# Patient Record
Sex: FEMALE | Race: WHITE | NOT HISPANIC OR LATINO | Employment: STUDENT | URBAN - METROPOLITAN AREA
[De-identification: names, ages, dates, MRNs, and addresses within clinical notes are randomized per-mention and may not be internally consistent; named-entity substitution may affect disease eponyms.]

---

## 2019-03-23 ENCOUNTER — TRANSFERRED RECORDS (OUTPATIENT)
Dept: HEALTH INFORMATION MANAGEMENT | Facility: CLINIC | Age: 24
End: 2019-03-23

## 2019-03-25 ENCOUNTER — DOCUMENTATION ONLY (OUTPATIENT)
Dept: CARE COORDINATION | Facility: CLINIC | Age: 24
End: 2019-03-25

## 2019-03-29 ENCOUNTER — OFFICE VISIT (OUTPATIENT)
Dept: DERMATOLOGY | Facility: CLINIC | Age: 24
End: 2019-03-29
Payer: COMMERCIAL

## 2019-03-29 VITALS — SYSTOLIC BLOOD PRESSURE: 99 MMHG | DIASTOLIC BLOOD PRESSURE: 67 MMHG | HEART RATE: 53 BPM

## 2019-03-29 DIAGNOSIS — L70.0 ACNE VULGARIS: Primary | ICD-10-CM

## 2019-03-29 RX ORDER — TRETINOIN 0.25 MG/G
1 CREAM TOPICAL
COMMUNITY
Start: 2018-03-30 | End: 2019-03-30

## 2019-03-29 RX ORDER — MINOCYCLINE HYDROCHLORIDE 50 MG/1
CAPSULE ORAL
COMMUNITY
Start: 2019-02-21 | End: 2019-10-28

## 2019-03-29 RX ORDER — CLINDAMYCIN PHOSPHATE 10 MG/G
1 GEL TOPICAL
COMMUNITY
Start: 2018-11-28 | End: 2019-10-28

## 2019-03-29 RX ORDER — SPIRONOLACTONE 50 MG/1
50 TABLET, FILM COATED ORAL 2 TIMES DAILY
Qty: 60 TABLET | Refills: 2 | Status: SHIPPED | OUTPATIENT
Start: 2019-03-29 | End: 2019-06-26

## 2019-03-29 ASSESSMENT — PAIN SCALES - GENERAL: PAINLEVEL: NO PAIN (0)

## 2019-03-29 NOTE — PROGRESS NOTES
[facility] Dermatology Note      Dermatology Problem List:  1. Acne vulgaris   - tretinoin 0.025% cream, clindamycin 1% gel, spironolactone 25 mg     Encounter Date: Mar 29, 2019    CC:  Chief Complaint   Patient presents with     Acne     Yaritza is here today to talk about acne treatment. She states that she has had issues with her acne since she was in high school. She has tried Clindamycin, Tretinoin, Oral Minocycline, OCP.          History of Present Illness:  Ms. Yaritza Vuong  is a 23 year old female who presents today for acne evaluation. This is the patient's first visit in our dermatology clinic. She has already been seen for acne by her primary care in [place], [place]. She is currently on oral minocycline, clindamycin gel, and tretinoin cream. This was started about a year ago. Minocycline was started half a year ago. Denies GI problems or other symptoms. She has been dealing with acne since high school. This worsened during once she started undergraduate. She has noticed some improvement with her current medication regimen. She would estimate 50% improvement. She hasn't been getting deeper acne, but instead more superficial breakouts and generalized redness. When she started her other acne medications, she also went on oral birth control. She only took this for about 3 months before stopping due to unwanted side effects. She did not find that this helped. Her menstrual cycle prior to birth control has always been more sporadic. Denies family history of ovarian cysts or PCOS. The patient is otherwise feeling well. There are no other skin concerns at this time.      Past Medical History:   Patient Active Problem List   Diagnosis     NO ACTIVE PROBLEMS     History reviewed. No pertinent past medical history.  History reviewed. No pertinent surgical history.    Social History:  Social History     Socioeconomic History     Marital status: Single     Spouse name: None     Number of  children: None     Years of education: None     Highest education level: None   Occupational History     None   Social Needs     Financial resource strain: None     Food insecurity:     Worry: None     Inability: None     Transportation needs:     Medical: None     Non-medical: None   Tobacco Use     Smoking status: Never Smoker     Smokeless tobacco: Never Used   Substance and Sexual Activity     Alcohol use: No     Drug use: No     Sexual activity: No   Lifestyle     Physical activity:     Days per week: None     Minutes per session: None     Stress: None   Relationships     Social connections:     Talks on phone: None     Gets together: None     Attends Zoroastrian service: None     Active member of club or organization: None     Attends meetings of clubs or organizations: None     Relationship status: None     Intimate partner violence:     Fear of current or ex partner: None     Emotionally abused: None     Physically abused: None     Forced sexual activity: None   Other Topics Concern     None   Social History Narrative     None       Family History:  History reviewed. No pertinent family history.    Medications:  Current Outpatient Medications   Medication Sig Dispense Refill     clindamycin (CLINDAMAX) 1 % external gel Apply 1 Application topically       minocycline (MINOCIN/DYNACIN) 50 MG capsule        spironolactone (ALDACTONE) 50 MG tablet Take 1 tablet (50 mg) by mouth 2 times daily 60 tablet 2     tretinoin (RETIN-A) 0.025 % external cream Apply 1 Application topically         No Known Allergies    Review of Systems:  -Skin Establ Pt: The patient denies any new rash, pruritus, or lesions that are symptomatic, changing or bleeding, except as per HPI.  -Constitutional: The patient is feeling generally well.    Physical exam:  Vitals: BP 99/67 (BP Location: Left arm, Patient Position: Chair, Cuff Size: Adult Regular)   Pulse 53   GEN: This is a well developed, well-nourished female in no acute distress, in  a pleasant mood.    SKIN: Acne exam, which includes the face, neck, upper central chest, and upper central back was performed.  - Scattered inflammatory papules to bilateral cheeks and upper back, as well as periorally.   - No other lesions of concern on areas examined.       Impression/Plan:  1. Acne vulgaris   Side effects of spironolactone discussed including postural hypotension, increased frequency of urination, breast tenderness, menstrual spotting, cardiac arrythmias and the need for contraception due to fetal feminization.    Baseline blood pressure obtained. Potassium (4.3), Sodium (143), Creatinine (0.76) and BUN/Cr (16) obtained by PCP were wnl. Discussed with patient that medication will need to be stopped if pregnancy is desired.   Start Spironolactone 50 mg daily which may be increased to twice daily after one week as tolerated.    There is no family history or personal history of breast malignancy known to patient.  Continue tretinoin 0.025% cream - apply topically to face.   Continue clindamycin 1% gel - apply topically to face.  Continue minocycline 50 mg bid. If acne improves, will begin tapering at next visit.     Follow-up in 3 months, earlier for new or changing lesions.       Staff Involved:  Staff Only    Scribe Disclosure:  I, Gurdeep Connolly, am serving as a scribe to document services personally performed by Suyapa Blake PA-C, based on data collection and the provider's statements to me.     Provider Disclosure:   The documentation recorded by the scribe accurately reflects the services I personally performed and the decisions made by me.    All risks, benefits and alternatives were discussed with patient.  Patient is in agreement and understands the assessment and plan.  All questions were answered.  Sun Screen Education was given.   Return to Clinic in 3 months or sooner as needed.   Suyapa Blake PA-C   [facility]

## 2019-03-29 NOTE — LETTER
3/29/2019       RE: Yaritza Vuong   [address] [place] [place] [address]     Dear Colleague,    Thank you for referring your patient, Yaritza Vuong , to the [facility] DERMATOLOGY at [facility]. Please see a copy of my visit note below.    [facility] Dermatology Note      Dermatology Problem List:  1. Acne vulgaris   - tretinoin 0.025% cream, clindamycin 1% gel, spironolactone 25 mg     Encounter Date: Mar 29, 2019    CC:  Chief Complaint   Patient presents with     Acne     Yaritza is here today to talk about acne treatment. She states that she has had issues with her acne since she was in high school. She has tried Clindamycin, Tretinoin, Oral Minocycline, OCP.          History of Present Illness:  Ms. Yaritza Vuong  is a 23 year old female who presents today for acne evaluation. This is the patient's first visit in our dermatology clinic. She has already been seen for acne by her primary care in [place], [place]. She is currently on oral minocycline, clindamycin gel, and tretinoin cream. This was started about a year ago. Minocycline was started half a year ago. Denies GI problems or other symptoms. She has been dealing with acne since high school. This worsened during once she started undergraduate. She has noticed some improvement with her current medication regimen. She would estimate 50% improvement. She hasn't been getting deeper acne, but instead more superficial breakouts and generalized redness. When she started her other acne medications, she also went on oral birth control. She only took this for about 3 months before stopping due to unwanted side effects. She did not find that this helped. Her menstrual cycle prior to birth control has always been more sporadic. Denies family history of ovarian cysts or PCOS. The patient is otherwise feeling well. There are no other skin concerns at this time.      Past Medical History:   Patient  Active Problem List   Diagnosis     NO ACTIVE PROBLEMS     History reviewed. No pertinent past medical history.  History reviewed. No pertinent surgical history.    Social History:  Social History     Socioeconomic History     Marital status: Single     Spouse name: None     Number of children: None     Years of education: None     Highest education level: None   Occupational History     None   Social Needs     Financial resource strain: None     Food insecurity:     Worry: None     Inability: None     Transportation needs:     Medical: None     Non-medical: None   Tobacco Use     Smoking status: Never Smoker     Smokeless tobacco: Never Used   Substance and Sexual Activity     Alcohol use: No     Drug use: No     Sexual activity: No   Lifestyle     Physical activity:     Days per week: None     Minutes per session: None     Stress: None   Relationships     Social connections:     Talks on phone: None     Gets together: None     Attends Catholic service: None     Active member of club or organization: None     Attends meetings of clubs or organizations: None     Relationship status: None     Intimate partner violence:     Fear of current or ex partner: None     Emotionally abused: None     Physically abused: None     Forced sexual activity: None   Other Topics Concern     None   Social History Narrative     None       Family History:  History reviewed. No pertinent family history.    Medications:  Current Outpatient Medications   Medication Sig Dispense Refill     clindamycin (CLINDAMAX) 1 % external gel Apply 1 Application topically       minocycline (MINOCIN/DYNACIN) 50 MG capsule        spironolactone (ALDACTONE) 50 MG tablet Take 1 tablet (50 mg) by mouth 2 times daily 60 tablet 2     tretinoin (RETIN-A) 0.025 % external cream Apply 1 Application topically         No Known Allergies    Review of Systems:  -Skin Establ Pt: The patient denies any new rash, pruritus, or lesions that are symptomatic, changing or  bleeding, except as per HPI.  -Constitutional: The patient is feeling generally well.    Physical exam:  Vitals: BP 99/67 (BP Location: Left arm, Patient Position: Chair, Cuff Size: Adult Regular)   Pulse 53   GEN: This is a well developed, well-nourished female in no acute distress, in a pleasant mood.    SKIN: Acne exam, which includes the face, neck, upper central chest, and upper central back was performed.  - Scattered inflammatory papules to bilateral cheeks and upper back, as well as periorally.   - No other lesions of concern on areas examined.       Impression/Plan:  1. Acne vulgaris   Side effects of spironolactone discussed including postural hypotension, increased frequency of urination, breast tenderness, menstrual spotting, cardiac arrythmias and the need for contraception due to fetal feminization.    Baseline blood pressure obtained. Potassium (4.3), Sodium (143), Creatinine (0.76) and BUN/Cr (16) obtained by PCP were wnl. Discussed with patient that medication will need to be stopped if pregnancy is desired.   Start Spironolactone 50 mg daily which may be increased to twice daily after one week as tolerated.    There is no family history or personal history of breast malignancy known to patient.  Continue tretinoin 0.025% cream - apply topically to face.   Continue clindamycin 1% gel - apply topically to face.  Continue minocycline 50 mg bid. If acne improves, will begin tapering at next visit.     Follow-up in 3 months, earlier for new or changing lesions.       Staff Involved:  Staff Only    Scribe Disclosure:  I, Gurdeep Connolly, am serving as a scribe to document services personally performed by Suyapa Blake PA-C, based on data collection and the provider's statements to me.     Provider Disclosure:   The documentation recorded by the scribe accurately reflects the services I personally performed and the decisions made by me.    All risks, benefits and alternatives were discussed  with patient.  Patient is in agreement and understands the assessment and plan.  All questions were answered.  Sun Screen Education was given.   Return to Clinic in 3 months or sooner as needed.   Suyapa Blake PA-C   [facility]

## 2019-03-29 NOTE — NURSING NOTE
Chief Complaint   Patient presents with     Acne     Yaritza is here today to talk about acne treatment. She states that she has had issues with her acne since she was in high school. She has tried Clindamycin, Tretinoin, Oral Minocycline, OCP.      Peggy Shore, [facility]

## 2019-04-19 ENCOUNTER — HEALTH MAINTENANCE LETTER (OUTPATIENT)
Age: 24
End: 2019-04-19

## 2019-06-26 ENCOUNTER — OFFICE VISIT (OUTPATIENT)
Dept: DERMATOLOGY | Facility: CLINIC | Age: 24
End: 2019-06-26
Payer: COMMERCIAL

## 2019-06-26 VITALS — SYSTOLIC BLOOD PRESSURE: 108 MMHG | DIASTOLIC BLOOD PRESSURE: 72 MMHG | HEART RATE: 53 BPM

## 2019-06-26 DIAGNOSIS — L70.0 ACNE VULGARIS: ICD-10-CM

## 2019-06-26 RX ORDER — SPIRONOLACTONE 50 MG/1
150 TABLET, FILM COATED ORAL DAILY
Qty: 90 TABLET | Refills: 2 | Status: SHIPPED | OUTPATIENT
Start: 2019-06-26 | End: 2019-10-10

## 2019-06-26 ASSESSMENT — PAIN SCALES - GENERAL: PAINLEVEL: NO PAIN (0)

## 2019-06-26 NOTE — PROGRESS NOTES
[facility] Dermatology Note      Dermatology Problem List:  1. Acne vulgaris   - tretinoin 0.025% cream, clindamycin 1% gel, spironolactone 150 mg  - s/p minocycline 50 mg     Encounter Date: Jun 26, 2019    CC:  Chief Complaint   Patient presents with     Derm Problem     Acne, Yaritza states her acne is slightly better.          History of Present Illness:  Ms. Yaritza Vuong  is a 23 year old female who presents today for acne follow up. She was last seen on 3/29/19 when she was started on spironolactone 100 mg daily for acne vulgaris.     Today she reports that her acne is slightly better than previously. However, she still experiences frequent superficial acne lesions. Denies deep, cystic papules. She estimates roughly 25% improvement in her acne. She has been taking all of the medications consistently, however she has been using less tretinoin than prescribed. She often uses a very small amount (half of a pea sized) of tretinoin nightly. She does not see much difference while using the tretinoin, aside from increased dryness. She would prefer to move away from the minocycline because she believes she is taking a lot of medications and not seeing much benefit. She does not think her frequent break outs are from increased sweat from the warmer weather.The patient is otherwise feeling well with no additional skin concerns today. . Denies menstrual irregularities, breast tenderness, or excessive urination.      Past Medical History:   Patient Active Problem List   Diagnosis     NO ACTIVE PROBLEMS     No past medical history on file.  No past surgical history on file.    Social History:  Social History     Socioeconomic History     Marital status: Single     Spouse name: None     Number of children: None     Years of education: None     Highest education level: None   Occupational History     None   Social Needs     Financial resource strain: None     Food insecurity:     Worry: None      Inability: None     Transportation needs:     Medical: None     Non-medical: None   Tobacco Use     Smoking status: Never Smoker     Smokeless tobacco: Never Used   Substance and Sexual Activity     Alcohol use: No     Drug use: No     Sexual activity: No   Lifestyle     Physical activity:     Days per week: None     Minutes per session: None     Stress: None   Relationships     Social connections:     Talks on phone: None     Gets together: None     Attends Yarsani service: None     Active member of club or organization: None     Attends meetings of clubs or organizations: None     Relationship status: None     Intimate partner violence:     Fear of current or ex partner: None     Emotionally abused: None     Physically abused: None     Forced sexual activity: None   Other Topics Concern     None   Social History Narrative     None       Family History:  No family history on file.    Medications:  Current Outpatient Medications   Medication Sig Dispense Refill     clindamycin (CLINDAMAX) 1 % external gel Apply 1 Application topically       minocycline (MINOCIN/DYNACIN) 50 MG capsule        spironolactone (ALDACTONE) 50 MG tablet Take 3 tablets (150 mg) by mouth daily 90 tablet 2       No Known Allergies    Review of Systems:  -Skin Establ Pt: The patient denies any new rash, pruritus, or lesions that are symptomatic, changing or bleeding, except as per HPI.  -Constitutional: The patient is feeling generally well.  The patient denies GI upset, abdominal pain, hematochezia,  headaches, blurred vision or dyspigmentation.    Physical exam:  Vitals: /72   Pulse 53   GEN: This is a well developed, well-nourished female in no acute distress, in a pleasant mood.    SKIN: Acne exam, which includes the face, neck, upper central chest, and upper central back was performed.  - Scattered closed comedones on her forehead  -Acne scarring noted to bilateral cheeks  - Scattered inflammatory papules periorally  - No other  lesions of concern on areas examined.       Impression/Plan:  1. Acne vulgaris - slight improvement on topicals and spironolactone. She defers starting alternative antibiotic today, will plan to increase current topical regimen and spironolactone dose.   Safety labs obtained at last visit. Repeat blood pressure obtained today.   Will increase daily dose of Spironolactone from 100 mg daily to 150 mg daily. Reviewed side effects of medication.    Increase use from 1/2 pea size to pea sized tretinoin 0.025% cream- apply topically to face. Retinoid ed given.  Continue clindamycin 1% gel - apply topically to face.  Start taper off minocycline, reduce daily dose to 100 mg daily then to every other day as tolerated.  If little to no improvement in symptoms at follow up, will consider Accutane process. Discussed risks and benefits of this medication.  Hx of poor tolerance to oral contraception. Pt states she is abstinent and plans for this.     Follow-up in 3 months, earlier for new or worsening symptoms       Scribe Disclosure:   I, Cristal Pinto, am serving as a scribe to document services personally performed by Suyapa Blake PA-C, based on data collection and the provider's statements to me.    Provider Disclosure:   The documentation recorded by the scribe accurately reflects the services I personally performed and the decisions made by me.    All risks, benefits and alternatives were discussed with patient.  Patient is in agreement and understands the assessment and plan.  All questions were answered.  Sun Screen Education was given.   Return to Clinic in 3 months or sooner as needed.   Suyapa Blake PA-C   [facility]

## 2019-06-26 NOTE — LETTER
6/26/2019       RE: Yaritza Vuong   [address] [place] [address]     Dear Colleague,    Thank you for referring your patient, Yaritza Vuong , to the [facility] DERMATOLOGY at [facility]. Please see a copy of my visit note below.    [facility] Dermatology Note      Dermatology Problem List:  1. Acne vulgaris   - tretinoin 0.025% cream, clindamycin 1% gel, spironolactone 150 mg  - s/p minocycline 50 mg     Encounter Date: Jun 26, 2019    CC:  Chief Complaint   Patient presents with     Derm Problem     Acne, Yaritza states her acne is slightly better.          History of Present Illness:  Ms. Yaritza Vuong  is a 23 year old female who presents today for acne follow up. She was last seen on 3/29/19 when she was started on spironolactone 100 mg daily for acne vulgaris.     Today she reports that her acne is slightly better than previously. However, she still experiences frequent superficial acne lesions. Denies deep, cystic papules. She estimates roughly 25% improvement in her acne. She has been taking all of the medications consistently, however she has been using less tretinoin than prescribed. She often uses a very small amount (half of a pea sized) of tretinoin nightly. She does not see much difference while using the tretinoin, aside from increased dryness. She would prefer to move away from the minocycline because she believes she is taking a lot of medications and not seeing much benefit. She does not think her frequent break outs are from increased sweat from the warmer weather.The patient is otherwise feeling well with no additional skin concerns today. . Denies menstrual irregularities, breast tenderness, or excessive urination.      Past Medical History:   Patient Active Problem List   Diagnosis     NO ACTIVE PROBLEMS     No past medical history on file.  No past surgical history on file.    Social History:  Social History      Socioeconomic History     Marital status: Single     Spouse name: None     Number of children: None     Years of education: None     Highest education level: None   Occupational History     None   Social Needs     Financial resource strain: None     Food insecurity:     Worry: None     Inability: None     Transportation needs:     Medical: None     Non-medical: None   Tobacco Use     Smoking status: Never Smoker     Smokeless tobacco: Never Used   Substance and Sexual Activity     Alcohol use: No     Drug use: No     Sexual activity: No   Lifestyle     Physical activity:     Days per week: None     Minutes per session: None     Stress: None   Relationships     Social connections:     Talks on phone: None     Gets together: None     Attends Restorationism service: None     Active member of club or organization: None     Attends meetings of clubs or organizations: None     Relationship status: None     Intimate partner violence:     Fear of current or ex partner: None     Emotionally abused: None     Physically abused: None     Forced sexual activity: None   Other Topics Concern     None   Social History Narrative     None       Family History:  No family history on file.    Medications:  Current Outpatient Medications   Medication Sig Dispense Refill     clindamycin (CLINDAMAX) 1 % external gel Apply 1 Application topically       minocycline (MINOCIN/DYNACIN) 50 MG capsule        spironolactone (ALDACTONE) 50 MG tablet Take 3 tablets (150 mg) by mouth daily 90 tablet 2       No Known Allergies    Review of Systems:  -Skin Establ Pt: The patient denies any new rash, pruritus, or lesions that are symptomatic, changing or bleeding, except as per HPI.  -Constitutional: The patient is feeling generally well.  The patient denies GI upset, abdominal pain, hematochezia,  headaches, blurred vision or dyspigmentation.    Physical exam:  Vitals: /72   Pulse 53   GEN: This is a well developed, well-nourished female in no  acute distress, in a pleasant mood.    SKIN: Acne exam, which includes the face, neck, upper central chest, and upper central back was performed.  - Scattered closed comedones on her forehead  -Acne scarring noted to bilateral cheeks  - Scattered inflammatory papules periorally  - No other lesions of concern on areas examined.       Impression/Plan:  1. Acne vulgaris - slight improvement on topicals and spironolactone. She defers starting alternative antibiotic today, will plan to increase current topical regimen and spironolactone dose.   Safety labs obtained at last visit. Repeat blood pressure obtained today.   Will increase daily dose of Spironolactone from 100 mg daily to 150 mg daily. Reviewed side effects of medication.    Increase use from 1/2 pea size to pea sized tretinoin 0.025% cream- apply topically to face. Retinoid ed given.  Continue clindamycin 1% gel - apply topically to face.  Start taper off minocycline, reduce daily dose to 100 mg daily then to every other day as tolerated.  If little to no improvement in symptoms at follow up, will consider Accutane process. Discussed risks and benefits of this medication.  Hx of poor tolerance to oral contraception. Pt states she is abstinent and plans for this.     Follow-up in 3 months, earlier for new or worsening symptoms       Scribe Disclosure:   I, Cristal Pinto, am serving as a scribe to document services personally performed by Suyapa Blake PA-C, based on data collection and the provider's statements to me.    Provider Disclosure:   The documentation recorded by the scribe accurately reflects the services I personally performed and the decisions made by me.    All risks, benefits and alternatives were discussed with patient.  Patient is in agreement and understands the assessment and plan.  All questions were answered.  Sun Screen Education was given.   Return to Clinic in 3 months or sooner as needed.   Suyapa Blake PA-C   [facility]  [place] [facility]

## 2019-06-26 NOTE — NURSING NOTE
Dermatology Rooming Note    Yaritza Vuong 's goals for this visit include:   Chief Complaint   Patient presents with     Derm Problem     Acne, Yaritza states her acne is slightly better.      Trina Alvarenga LPN

## 2019-09-27 ENCOUNTER — OFFICE VISIT (OUTPATIENT)
Dept: DERMATOLOGY | Facility: CLINIC | Age: 24
End: 2019-09-27
Payer: COMMERCIAL

## 2019-09-27 VITALS — DIASTOLIC BLOOD PRESSURE: 74 MMHG | SYSTOLIC BLOOD PRESSURE: 113 MMHG | BODY MASS INDEX: 22.54 KG/M2 | WEIGHT: 147 LBS

## 2019-09-27 DIAGNOSIS — L70.0 ACNE VULGARIS: ICD-10-CM

## 2019-09-27 DIAGNOSIS — Z79.899 MEDICATION MANAGEMENT: ICD-10-CM

## 2019-09-27 DIAGNOSIS — L70.0 ACNE VULGARIS: Primary | ICD-10-CM

## 2019-09-27 LAB — HCG UR QL: NEGATIVE

## 2019-09-27 ASSESSMENT — PAIN SCALES - GENERAL: PAINLEVEL: NO PAIN (0)

## 2019-09-27 NOTE — LETTER
9/27/2019       RE: Yaritza Vuong   [id number] [facility] [id number]     Dear Colleague,    Thank you for referring your patient, Yaritza Vuong , to the [facility] DERMATOLOGY at [facility]. Please see a copy of my visit note below.    [facility] Dermatology Note      Dermatology Problem List:  1. Acne vulgaris   - initiated Accutane 9/27/19  - s/p minocycline 50 mg, tretinoin 0.025% cream, clindamycin 1% gel, spironolactone 150 mg    Encounter Date: Sep 27, 2019    CC:  Chief Complaint   Patient presents with     Derm Problem     Acne follow up, Yaritza would like to start accutane.         History of Present Illness:  Ms. Yaritza Vuong  is a 24 year old female who presents today for acne follow up. The patient was last seen on 6/26/19 when she increased her dose of spironolactone from 100 mg to 150 mg daily and continued tretinoin 0.025% cream and clindamycin 1% gel. Today she reports that her acne hasn't improved as much as she desires She is interested in starting Accutane. She has researched this and does not have many questions. She is committed to abstinence. No history of depression or IBD. The patient is otherwise feeling well. There are no other skin concerns at this time.      Past Medical History:   Patient Active Problem List   Diagnosis     NO ACTIVE PROBLEMS     No past medical history on file.  No past surgical history on file.    Social History:  Social History     Socioeconomic History     Marital status: Single     Spouse name: None     Number of children: None     Years of education: None     Highest education level: None   Occupational History     None   Social Needs     Financial resource strain: None     Food insecurity:     Worry: None     Inability: None     Transportation needs:     Medical: None     Non-medical: None   Tobacco Use     Smoking status: Never Smoker     Smokeless tobacco: Never Used   Substance  and Sexual Activity     Alcohol use: No     Drug use: No     Sexual activity: No   Lifestyle     Physical activity:     Days per week: None     Minutes per session: None     Stress: None   Relationships     Social connections:     Talks on phone: None     Gets together: None     Attends Alevism service: None     Active member of club or organization: None     Attends meetings of clubs or organizations: None     Relationship status: None     Intimate partner violence:     Fear of current or ex partner: None     Emotionally abused: None     Physically abused: None     Forced sexual activity: None   Other Topics Concern     None   Social History Narrative     None       Family History:  No family history on file.    Medications:  Current Outpatient Medications   Medication Sig Dispense Refill     clindamycin (CLINDAMAX) 1 % external gel Apply 1 Application topically       minocycline (MINOCIN/DYNACIN) 50 MG capsule        spironolactone (ALDACTONE) 50 MG tablet Take 3 tablets (150 mg) by mouth daily 90 tablet 2       No Known Allergies    Review of Systems:  -Skin Establ Pt: The patient denies any new rash, pruritus, or lesions that are symptomatic, changing or bleeding, except as per HPI.  -Constitutional: The patient is feeling generally well.  The patient denies GI upset, abdominal pain, hematochezia,  headaches, blurred vision or dyspigmentation.    Physical exam:  Vitals: /74   GEN: This is a well developed, well-nourished female in no acute distress, in a pleasant mood.    SKIN: Acne exam, which includes the face, neck, upper central chest, and upper central back was performed.  - Scattered inflammatory papules mainly to the lower face, pt is wearing makeup.   - No other lesions of concern on areas examined.       Impression/Plan:  1. Acne vulgaris resilient despite increasing doses of  Spironolactone, oral and topical antibiotics and topical retinoids.   Will start isotretinoin 40mg next month. Goal dose  is 10,005- 14,674 mg (150-220mg/kg) for this 66.7 kg patient.   Method of contraception includes: abstinence   Discussion of the risks and side effects of isotretinoin including but not limited to mucocutaneous dryness, arthralgias, myalgias, depression, suicidal ideation, headache, blurred vision, increase in liver function test and increase in lipids. The iPledge program brochure was provided and the contents discussed with the patient. The patient was counseled that they cannot give blood while on isotretinoin. Advised against tattoos and waxing. No personal or family history of inflammatory bowel disease or hypertriglyceridemia known to patient. Reviewed need to avoid alcohol on medication. The iPledge program consent was obtained. Patient counseled that if they wear contacts, the eyes may become too dry to tolerate. Recommend follow up with eye doctor if this occurs.    Discussed need for sun protection, at least SPF 30+.   Urine pregnancy test obtained today.   Next month: baseline labs including qualitative hCG, CBC, CMP and fasting lipids will be obtained.   Patient's iPledge # is [id number].   The patient will stop all other acne medications next month.  Total dose: 0mg/kg    Follow-up in 1 month for labs and 2 months for clinic f/u, earlier for new or worsening symptoms       Staff Involved:  Staff Only    Scribe Disclosure:  I, Gurdeep Connolly, am serving as a scribe to document services personally performed by Suyapa Blake PA-C, based on data collection and the provider's statements to me.     Provider Disclosure:   The documentation recorded by the scribe accurately reflects the services I personally performed and the decisions made by me.    All risks, benefits and alternatives were discussed with patient.  Patient is in agreement and understands the assessment and plan.  All questions were answered.  Sun Screen Education was given.   Return to Clinic in 2 months or sooner as needed.   Suyapa  Hayden MAHONEY   [facility]

## 2019-09-27 NOTE — NURSING NOTE
Dermatology Rooming Note    Yaritza Vuong 's goals for this visit include:   Chief Complaint   Patient presents with     Derm Problem     Acne follow up, Yaritza would like to start accutane.     Trina Alvarenga LPN

## 2019-09-27 NOTE — PROGRESS NOTES
[facility] Dermatology Note      Dermatology Problem List:  1. Acne vulgaris   - initiated Accutane 9/27/19  - s/p minocycline 50 mg, tretinoin 0.025% cream, clindamycin 1% gel, spironolactone 150 mg    Encounter Date: Sep 27, 2019    CC:  Chief Complaint   Patient presents with     Derm Problem     Acne follow up, Yaritza would like to start accutane.         History of Present Illness:  Ms. Yaritza Vuong  is a 24 year old female who presents today for acne follow up. The patient was last seen on 6/26/19 when she increased her dose of spironolactone from 100 mg to 150 mg daily and continued tretinoin 0.025% cream and clindamycin 1% gel. Today she reports that her acne hasn't improved as much as she desires She is interested in starting Accutane. She has researched this and does not have many questions. She is committed to abstinence. No history of depression or IBD. The patient is otherwise feeling well. There are no other skin concerns at this time.      Past Medical History:   Patient Active Problem List   Diagnosis     NO ACTIVE PROBLEMS     No past medical history on file.  No past surgical history on file.    Social History:  Social History     Socioeconomic History     Marital status: Single     Spouse name: None     Number of children: None     Years of education: None     Highest education level: None   Occupational History     None   Social Needs     Financial resource strain: None     Food insecurity:     Worry: None     Inability: None     Transportation needs:     Medical: None     Non-medical: None   Tobacco Use     Smoking status: Never Smoker     Smokeless tobacco: Never Used   Substance and Sexual Activity     Alcohol use: No     Drug use: No     Sexual activity: No   Lifestyle     Physical activity:     Days per week: None     Minutes per session: None     Stress: None   Relationships     Social connections:     Talks on phone: None     Gets together: None     Attends  Restorationist service: None     Active member of club or organization: None     Attends meetings of clubs or organizations: None     Relationship status: None     Intimate partner violence:     Fear of current or ex partner: None     Emotionally abused: None     Physically abused: None     Forced sexual activity: None   Other Topics Concern     None   Social History Narrative     None       Family History:  No family history on file.    Medications:  Current Outpatient Medications   Medication Sig Dispense Refill     clindamycin (CLINDAMAX) 1 % external gel Apply 1 Application topically       minocycline (MINOCIN/DYNACIN) 50 MG capsule        spironolactone (ALDACTONE) 50 MG tablet Take 3 tablets (150 mg) by mouth daily 90 tablet 2       No Known Allergies    Review of Systems:  -Skin Establ Pt: The patient denies any new rash, pruritus, or lesions that are symptomatic, changing or bleeding, except as per HPI.  -Constitutional: The patient is feeling generally well.  The patient denies GI upset, abdominal pain, hematochezia,  headaches, blurred vision or dyspigmentation.    Physical exam:  Vitals: /74   GEN: This is a well developed, well-nourished female in no acute distress, in a pleasant mood.    SKIN: Acne exam, which includes the face, neck, upper central chest, and upper central back was performed.  - Scattered inflammatory papules mainly to the lower face, pt is wearing makeup.   - No other lesions of concern on areas examined.       Impression/Plan:  1. Acne vulgaris resilient despite increasing doses of  Spironolactone, oral and topical antibiotics and topical retinoids.   Will start isotretinoin 40mg next month. Goal dose is 10,005- 14,674 mg (150-220mg/kg) for this 66.7 kg patient.   Method of contraception includes: abstinence   Discussion of the risks and side effects of isotretinoin including but not limited to mucocutaneous dryness, arthralgias, myalgias, depression, suicidal ideation, headache,  blurred vision, increase in liver function test and increase in lipids. The iPledge program brochure was provided and the contents discussed with the patient. The patient was counseled that they cannot give blood while on isotretinoin. Advised against tattoos and waxing. No personal or family history of inflammatory bowel disease or hypertriglyceridemia known to patient. Reviewed need to avoid alcohol on medication. The iPledge program consent was obtained. Patient counseled that if they wear contacts, the eyes may become too dry to tolerate. Recommend follow up with eye doctor if this occurs.    Discussed need for sun protection, at least SPF 30+.   Urine pregnancy test obtained today.   Next month: baseline labs including qualitative hCG, CBC, CMP and fasting lipids will be obtained.   Patient's iPledge # is [id number].   The patient will stop all other acne medications next month.  Total dose: 0mg/kg    Follow-up in 1 month for labs and 2 months for clinic f/u, earlier for new or worsening symptoms       Staff Involved:  Staff Only    Scribe Disclosure:  I, Gurdeep Connolly, am serving as a scribe to document services personally performed by Suyapa Blake PA-C, based on data collection and the provider's statements to me.     Provider Disclosure:   The documentation recorded by the scribe accurately reflects the services I personally performed and the decisions made by me.    All risks, benefits and alternatives were discussed with patient.  Patient is in agreement and understands the assessment and plan.  All questions were answered.  Sun Screen Education was given.   Return to Clinic in 2 months or sooner as needed.   Suyapa Blake PA-C   [facility]

## 2019-10-01 ENCOUNTER — HEALTH MAINTENANCE LETTER (OUTPATIENT)
Age: 24
End: 2019-10-01

## 2019-10-10 DIAGNOSIS — L70.0 ACNE VULGARIS: ICD-10-CM

## 2019-10-16 RX ORDER — SPIRONOLACTONE 50 MG/1
150 TABLET, FILM COATED ORAL DAILY
Qty: 90 TABLET | Refills: 5 | Status: SHIPPED | OUTPATIENT
Start: 2019-10-16 | End: 2019-10-28

## 2019-10-28 DIAGNOSIS — L70.0 ACNE VULGARIS: ICD-10-CM

## 2019-10-28 DIAGNOSIS — Z79.899 MEDICATION MANAGEMENT: ICD-10-CM

## 2019-10-28 DIAGNOSIS — L70.0 ACNE VULGARIS: Primary | ICD-10-CM

## 2019-10-28 LAB
ALBUMIN SERPL-MCNC: 3.7 G/DL (ref 3.4–5)
ALP SERPL-CCNC: 50 U/L (ref 40–150)
ALT SERPL W P-5'-P-CCNC: 22 U/L (ref 0–50)
ANION GAP SERPL CALCULATED.3IONS-SCNC: 5 MMOL/L (ref 3–14)
AST SERPL W P-5'-P-CCNC: 20 U/L (ref 0–45)
BASOPHILS # BLD AUTO: 0 10E9/L (ref 0–0.2)
BASOPHILS NFR BLD AUTO: 0.7 %
BILIRUB SERPL-MCNC: 0.4 MG/DL (ref 0.2–1.3)
BUN SERPL-MCNC: 7 MG/DL (ref 7–30)
CALCIUM SERPL-MCNC: 8.9 MG/DL (ref 8.5–10.1)
CHLORIDE SERPL-SCNC: 107 MMOL/L (ref 94–109)
CHOLEST SERPL-MCNC: 114 MG/DL
CO2 SERPL-SCNC: 27 MMOL/L (ref 20–32)
CREAT SERPL-MCNC: 0.63 MG/DL (ref 0.52–1.04)
DIFFERENTIAL METHOD BLD: NORMAL
EOSINOPHIL # BLD AUTO: 0.2 10E9/L (ref 0–0.7)
EOSINOPHIL NFR BLD AUTO: 3.5 %
ERYTHROCYTE [DISTWIDTH] IN BLOOD BY AUTOMATED COUNT: 11.5 % (ref 10–15)
GFR SERPL CREATININE-BSD FRML MDRD: >90 ML/MIN/{1.73_M2}
GLUCOSE SERPL-MCNC: 102 MG/DL (ref 70–99)
HCG UR QL: NEGATIVE
HCT VFR BLD AUTO: 39.3 % (ref 35–47)
HDLC SERPL-MCNC: 66 MG/DL
HGB BLD-MCNC: 13.1 G/DL (ref 11.7–15.7)
IMM GRANULOCYTES # BLD: 0 10E9/L (ref 0–0.4)
IMM GRANULOCYTES NFR BLD: 0 %
LDLC SERPL CALC-MCNC: 37 MG/DL
LYMPHOCYTES # BLD AUTO: 1.7 10E9/L (ref 0.8–5.3)
LYMPHOCYTES NFR BLD AUTO: 39.7 %
MCH RBC QN AUTO: 31.6 PG (ref 26.5–33)
MCHC RBC AUTO-ENTMCNC: 33.3 G/DL (ref 31.5–36.5)
MCV RBC AUTO: 95 FL (ref 78–100)
MONOCYTES # BLD AUTO: 0.5 10E9/L (ref 0–1.3)
MONOCYTES NFR BLD AUTO: 12.5 %
NEUTROPHILS # BLD AUTO: 1.8 10E9/L (ref 1.6–8.3)
NEUTROPHILS NFR BLD AUTO: 43.6 %
NONHDLC SERPL-MCNC: 47 MG/DL
NRBC # BLD AUTO: 0 10*3/UL
NRBC BLD AUTO-RTO: 0 /100
PLATELET # BLD AUTO: 269 10E9/L (ref 150–450)
POTASSIUM SERPL-SCNC: 3.8 MMOL/L (ref 3.4–5.3)
PROT SERPL-MCNC: 7.4 G/DL (ref 6.8–8.8)
RBC # BLD AUTO: 4.14 10E12/L (ref 3.8–5.2)
SODIUM SERPL-SCNC: 139 MMOL/L (ref 133–144)
TRIGL SERPL-MCNC: 48 MG/DL
WBC # BLD AUTO: 4.2 10E9/L (ref 4–11)

## 2019-10-28 RX ORDER — ISOTRETINOIN 40 MG/1
40 CAPSULE ORAL
Qty: 30 CAPSULE | Refills: 0 | Status: SHIPPED | OUTPATIENT
Start: 2019-10-28 | End: 2019-12-30

## 2019-11-25 ENCOUNTER — OFFICE VISIT (OUTPATIENT)
Dept: DERMATOLOGY | Facility: CLINIC | Age: 24
End: 2019-11-25
Payer: COMMERCIAL

## 2019-11-25 DIAGNOSIS — L70.0 ACNE VULGARIS: ICD-10-CM

## 2019-11-25 DIAGNOSIS — L70.0 ACNE VULGARIS: Primary | ICD-10-CM

## 2019-11-25 DIAGNOSIS — Z79.899 ON ISOTRETINOIN THERAPY: ICD-10-CM

## 2019-11-25 LAB
ALBUMIN SERPL-MCNC: 3.9 G/DL (ref 3.4–5)
ALP SERPL-CCNC: 50 U/L (ref 40–150)
ALT SERPL W P-5'-P-CCNC: 29 U/L (ref 0–50)
ANION GAP SERPL CALCULATED.3IONS-SCNC: 3 MMOL/L (ref 3–14)
AST SERPL W P-5'-P-CCNC: 23 U/L (ref 0–45)
BASOPHILS # BLD AUTO: 0.1 10E9/L (ref 0–0.2)
BASOPHILS NFR BLD AUTO: 0.9 %
BILIRUB SERPL-MCNC: 0.5 MG/DL (ref 0.2–1.3)
BUN SERPL-MCNC: 9 MG/DL (ref 7–30)
CALCIUM SERPL-MCNC: 9 MG/DL (ref 8.5–10.1)
CHLORIDE SERPL-SCNC: 103 MMOL/L (ref 94–109)
CO2 SERPL-SCNC: 27 MMOL/L (ref 20–32)
CREAT SERPL-MCNC: 0.61 MG/DL (ref 0.52–1.04)
DIFFERENTIAL METHOD BLD: NORMAL
EOSINOPHIL # BLD AUTO: 0.1 10E9/L (ref 0–0.7)
EOSINOPHIL NFR BLD AUTO: 1.7 %
ERYTHROCYTE [DISTWIDTH] IN BLOOD BY AUTOMATED COUNT: 12 % (ref 10–15)
GFR SERPL CREATININE-BSD FRML MDRD: >90 ML/MIN/{1.73_M2}
GLUCOSE SERPL-MCNC: 91 MG/DL (ref 70–99)
HCG UR QL: NEGATIVE
HCT VFR BLD AUTO: 40.3 % (ref 35–47)
HGB BLD-MCNC: 13.5 G/DL (ref 11.7–15.7)
IMM GRANULOCYTES # BLD: 0 10E9/L (ref 0–0.4)
IMM GRANULOCYTES NFR BLD: 0 %
LYMPHOCYTES # BLD AUTO: 1.6 10E9/L (ref 0.8–5.3)
LYMPHOCYTES NFR BLD AUTO: 30 %
MCH RBC QN AUTO: 31.4 PG (ref 26.5–33)
MCHC RBC AUTO-ENTMCNC: 33.5 G/DL (ref 31.5–36.5)
MCV RBC AUTO: 94 FL (ref 78–100)
MONOCYTES # BLD AUTO: 0.7 10E9/L (ref 0–1.3)
MONOCYTES NFR BLD AUTO: 12.7 %
NEUTROPHILS # BLD AUTO: 2.9 10E9/L (ref 1.6–8.3)
NEUTROPHILS NFR BLD AUTO: 54.7 %
NRBC # BLD AUTO: 0 10*3/UL
NRBC BLD AUTO-RTO: 0 /100
PLATELET # BLD AUTO: 263 10E9/L (ref 150–450)
POTASSIUM SERPL-SCNC: 3.6 MMOL/L (ref 3.4–5.3)
PROT SERPL-MCNC: 8 G/DL (ref 6.8–8.8)
RBC # BLD AUTO: 4.3 10E12/L (ref 3.8–5.2)
SODIUM SERPL-SCNC: 133 MMOL/L (ref 133–144)
TRIGL SERPL-MCNC: 68 MG/DL
WBC # BLD AUTO: 5.3 10E9/L (ref 4–11)

## 2019-11-25 RX ORDER — NORGESTIMATE AND ETHINYL ESTRADIOL 7DAYSX3 LO
1 KIT ORAL DAILY
Qty: 28 TABLET | Refills: 5 | Status: SHIPPED | OUTPATIENT
Start: 2019-11-25 | End: 2020-05-21

## 2019-11-25 RX ORDER — ISOTRETINOIN 30 MG/1
60 CAPSULE ORAL
Qty: 60 CAPSULE | Refills: 0 | Status: SHIPPED | OUTPATIENT
Start: 2019-11-25 | End: 2019-12-30

## 2019-11-25 ASSESSMENT — PAIN SCALES - GENERAL: PAINLEVEL: NO PAIN (0)

## 2019-11-25 NOTE — PROGRESS NOTES
[facility] Dermatology Note      Dermatology Problem List:  1. Acne vulgaris   - initiated Accutane 40 mg daily 10/28/19, increased to 60 mg daily 11/25/19  - s/p minocycline 50 mg, tretinoin 0.025% cream, clindamycin 1% gel, spironolactone 150 mg    Encounter Date: Nov 25, 2019    CC:  Chief Complaint   Patient presents with     Derm Problem     Yaritza is here today for Accutane. Yaritza notes some dryness, no other concerns.          History of Present Illness:  Ms. Yaritza Vuong  is a 24 year old female who presents today for acne follow up. She was last seen in the dermatology clinic on 9/27/19 for acne vulgaris when she initiated the Accutane process. She initiated isotretinoin 40 mg daily on 10/28/19. Today she is at the end of month #1.    She reports that she has been experiencing lip dryness in the last week. She has been controlling this with Vaseline and Aquaphor products. She has not noticed any new breakouts in the last month since initiating the medication. The patient is fasting today. She is interested in being extra precautious on Accutane and initiating another birth control measure. She notes that she has previously gone on oral contraceptive in the past and experienced mild moodiness but would like to start one of these again.     Denies headaches, visual changes, epistaxis, confusion, fever, arthralgias, myalgias, abdominal pain, stool changes, hematochezia, mood changes, depression or suicidal ideations.      Past Medical History:   Denies family history of clotting disorders.  Admits to a family history of breast cancer in a few of her aunts.   Patient Active Problem List   Diagnosis     NO ACTIVE PROBLEMS     No past medical history on file.  No past surgical history on file.    Social History:  Social History     Socioeconomic History     Marital status: Single     Spouse name: None     Number of children: None     Years of education: None     Highest education  level: None   Occupational History     None   Social Needs     Financial resource strain: None     Food insecurity:     Worry: None     Inability: None     Transportation needs:     Medical: None     Non-medical: None   Tobacco Use     Smoking status: Never Smoker     Smokeless tobacco: Never Used   Substance and Sexual Activity     Alcohol use: No     Drug use: No     Sexual activity: No   Lifestyle     Physical activity:     Days per week: None     Minutes per session: None     Stress: None   Relationships     Social connections:     Talks on phone: None     Gets together: None     Attends Orthodox service: None     Active member of club or organization: None     Attends meetings of clubs or organizations: None     Relationship status: None     Intimate partner violence:     Fear of current or ex partner: None     Emotionally abused: None     Physically abused: None     Forced sexual activity: None   Other Topics Concern     None   Social History Narrative     None       Family History:  No family history on file.    Medications:  Current Outpatient Medications   Medication Sig Dispense Refill     ISOtretinoin (ACCUTANE) 40 MG capsule Take 1 capsule (40 mg) by mouth daily with food 30 capsule 0     norgestim-eth estrad triphasic (ORTHO TRI-CYCLEN LO) 0.18/0.215/0.25 MG-25 MCG tablet Take 1 tablet by mouth daily 28 tablet 5       No Known Allergies    Review of Systems:  -Skin Establ Pt: The patient denies any new rash, pruritus, or lesions that are symptomatic, changing or bleeding, except as per HPI.  -Constitutional: The patient is feeling generally well.  The patient denies GI upset, abdominal pain, hematochezia,  headaches, blurred vision or dyspigmentation.    Physical exam:  Vitals: There were no vitals taken for this visit.  GEN: This is a well developed, well-nourished female in no acute distress, in a pleasant mood.    SKIN: Acne exam, which includes the face, neck, upper central chest, and upper central  back was performed.    - Several closed comedone scattered to the lower face  - No large inflamatory papules  - She is wearing makeup today  - No other lesions of concern on areas examined.       Impression/Plan:  1. Acne vulgaris, on isotretinoin. Resilient despite increasing doses of spironolactone, oral and topical antibiotics and topical retinoids.   Start Ortho tri cyclin lo. Take one tablet po daily. Discussed risks of dvt, pe and indications to be evaluated or call the office. There is a family history of breast cancer but no blood clots or stroke. She is a non smoker. Discussed returning to OB/GYN in the future if considering a true long term contraception.   Increase isotretinoin to 60mg daily with food containing fat. Goal dose is 10,005- 14,674 mg (150-220mg/kg) for this 66.7 kg patient.   Method of contraception includes: abstinence.  Discussion of the risks and side effects of isotretinoin including but not limited to mucocutaneous dryness, arthralgias, myalgias, depression, suicidal ideation, headache, blurred vision, increase in liver function test and increase in lipids. The iPledge program brochure was provided and the contents discussed with the patient. The patient was counseled that they cannot give blood while on isotretinoin. Advised against tattoos and waxing. No personal or family history of inflammatory bowel disease or hypertriglyceridemia known to patient. Reviewed need to avoid alcohol on medication. The iPledge program consent was obtained. Patient counseled that if they wear contacts, the eyes may become too dry to tolerate. Recommend follow up with eye doctor if this occurs.    Discussed need for sun protection, at least SPF 30+.   Urine pregnancy test obtained today.   Labs: including qualitative hCG, CBC, CMP and fasting lipids will be obtained.   Patient's iPledge # is [id number].   Total dose: 18.0mg/kg    Follow-up in 1 month, earlier for new or changing lesions.       Staff  Involved:  Scribe/Staff    Scribe Disclosure:   I, Darius Bunch, am serving as a scribe to document services personally performed by Suyapa Blake PA-C, based on data collection and the provider's statements to me.    Provider Disclosure:   The documentation recorded by the scribe accurately reflects the services I personally performed and the decisions made by me.    All risks, benefits and alternatives were discussed with patient.  Patient is in agreement and understands the assessment and plan.  All questions were answered.  Sun Screen Education was given.   Return to Clinic in 1 month or sooner as needed.   Suyapa Blake PA-C   [facility]

## 2019-11-25 NOTE — NURSING NOTE
Chief Complaint   Patient presents with     Derm Problem     Yaritza is here today for Accutane. Yaritza notes some dryness, no other concerns.      Sheryl Costello LPN

## 2019-11-25 NOTE — LETTER
11/25/2019       RE: Yaritza Vuong   [address] [facility] [id number]     Dear Colleague,    Thank you for referring your patient, Yaritza Vuong , to the [facility] DERMATOLOGY at [facility]. Please see a copy of my visit note below.    [facility] Dermatology Note      Dermatology Problem List:  1. Acne vulgaris   - initiated Accutane 40 mg daily 10/28/19, increased to 60 mg daily 11/25/19  - s/p minocycline 50 mg, tretinoin 0.025% cream, clindamycin 1% gel, spironolactone 150 mg    Encounter Date: Nov 25, 2019    CC:  Chief Complaint   Patient presents with     Derm Problem     Yaritza is here today for Accutane. Yaritza notes some dryness, no other concerns.          History of Present Illness:  Ms. Yaritza Vuong  is a 24 year old female who presents today for acne follow up. She was last seen in the dermatology clinic on 9/27/19 for acne vulgaris when she initiated the Accutane process. She initiated isotretinoin 40 mg daily on 10/28/19. Today she is at the end of month #1.    She reports that she has been experiencing lip dryness in the last week. She has been controlling this with Vaseline and Aquaphor products. She has not noticed any new breakouts in the last month since initiating the medication. The patient is fasting today. She is interested in being extra precautious on Accutane and initiating another birth control measure. She notes that she has previously gone on oral contraceptive in the past and experienced mild moodiness but would like to start one of these again.     Denies headaches, visual changes, epistaxis, confusion, fever, arthralgias, myalgias, abdominal pain, stool changes, hematochezia, mood changes, depression or suicidal ideations.      Past Medical History:   Denies family history of clotting disorders.  Admits to a family history of breast cancer in a few of her aunts.   Patient Active Problem List    Diagnosis     NO ACTIVE PROBLEMS     No past medical history on file.  No past surgical history on file.    Social History:  Social History     Socioeconomic History     Marital status: Single     Spouse name: None     Number of children: None     Years of education: None     Highest education level: None   Occupational History     None   Social Needs     Financial resource strain: None     Food insecurity:     Worry: None     Inability: None     Transportation needs:     Medical: None     Non-medical: None   Tobacco Use     Smoking status: Never Smoker     Smokeless tobacco: Never Used   Substance and Sexual Activity     Alcohol use: No     Drug use: No     Sexual activity: No   Lifestyle     Physical activity:     Days per week: None     Minutes per session: None     Stress: None   Relationships     Social connections:     Talks on phone: None     Gets together: None     Attends Denominational service: None     Active member of club or organization: None     Attends meetings of clubs or organizations: None     Relationship status: None     Intimate partner violence:     Fear of current or ex partner: None     Emotionally abused: None     Physically abused: None     Forced sexual activity: None   Other Topics Concern     None   Social History Narrative     None       Family History:  No family history on file.    Medications:  Current Outpatient Medications   Medication Sig Dispense Refill     ISOtretinoin (ACCUTANE) 40 MG capsule Take 1 capsule (40 mg) by mouth daily with food 30 capsule 0     norgestim-eth estrad triphasic (ORTHO TRI-CYCLEN LO) 0.18/0.215/0.25 MG-25 MCG tablet Take 1 tablet by mouth daily 28 tablet 5       No Known Allergies    Review of Systems:  -Skin Establ Pt: The patient denies any new rash, pruritus, or lesions that are symptomatic, changing or bleeding, except as per HPI.  -Constitutional: The patient is feeling generally well.  The patient denies GI upset, abdominal pain, hematochezia,   headaches, blurred vision or dyspigmentation.    Physical exam:  Vitals: There were no vitals taken for this visit.  GEN: This is a well developed, well-nourished female in no acute distress, in a pleasant mood.    SKIN: Acne exam, which includes the face, neck, upper central chest, and upper central back was performed.    - Several closed comedone scattered to the lower face  - No large inflamatory papules  - She is wearing makeup today  - No other lesions of concern on areas examined.       Impression/Plan:  1. Acne vulgaris, on isotretinoin. Resilient despite increasing doses of spironolactone, oral and topical antibiotics and topical retinoids.   Start Ortho tri cyclin lo. Take one tablet po daily. Discussed risks of dvt, pe and indications to be evaluated or call the office. There is a family history of breast cancer but no blood clots or stroke. She is a non smoker. Discussed returning to OB/GYN in the future if considering a true long term contraception.   Increase isotretinoin to 60mg daily with food containing fat. Goal dose is 10,005- 14,674 mg (150-220mg/kg) for this 66.7 kg patient.   Method of contraception includes: abstinence.  Discussion of the risks and side effects of isotretinoin including but not limited to mucocutaneous dryness, arthralgias, myalgias, depression, suicidal ideation, headache, blurred vision, increase in liver function test and increase in lipids. The iPledge program brochure was provided and the contents discussed with the patient. The patient was counseled that they cannot give blood while on isotretinoin. Advised against tattoos and waxing. No personal or family history of inflammatory bowel disease or hypertriglyceridemia known to patient. Reviewed need to avoid alcohol on medication. The iPledge program consent was obtained. Patient counseled that if they wear contacts, the eyes may become too dry to tolerate. Recommend follow up with eye doctor if this occurs.    Discussed  need for sun protection, at least SPF 30+.   Urine pregnancy test obtained today.   Labs: including qualitative hCG, CBC, CMP and fasting lipids will be obtained.   Patient's iPledge # is [id number].   Total dose: 18.0mg/kg    Follow-up in 1 month, earlier for new or changing lesions.       Staff Involved:  Scribe/Staff    Scribe Disclosure:   I, Darius Bunch, am serving as a scribe to document services personally performed by Suyapa Blake PA-C, based on data collection and the provider's statements to me.    Provider Disclosure:   The documentation recorded by the scribe accurately reflects the services I personally performed and the decisions made by me.    All risks, benefits and alternatives were discussed with patient.  Patient is in agreement and understands the assessment and plan.  All questions were answered.  Sun Screen Education was given.   Return to Clinic in 1 month or sooner as needed.   Suyapa Blake PA-C   [facility]     Again, thank you for allowing me to participate in the care of your patient.      Sincerely,    Suyapa Blake PA-C

## 2019-11-25 NOTE — LETTER
11/25/2019      RE: Yaritza Vuong   [address] [address] [place] [id number]       [facility] Dermatology Note      Dermatology Problem List:  1. Acne vulgaris   - initiated Accutane 40 mg daily 10/28/19, increased to 60 mg daily 11/25/19  - s/p minocycline 50 mg, tretinoin 0.025% cream, clindamycin 1% gel, spironolactone 150 mg    Encounter Date: Nov 25, 2019    CC:  Chief Complaint   Patient presents with     Derm Problem     Yaritza is here today for Accutane. Yaritza notes some dryness, no other concerns.          History of Present Illness:  Ms. Yaritza Vuong  is a 24 year old female who presents today for acne follow up. She was last seen in the dermatology clinic on 9/27/19 for acne vulgaris when she initiated the Accutane process. She initiated isotretinoin 40 mg daily on 10/28/19. Today she is at the end of month #1.    She reports that she has been experiencing lip dryness in the last week. She has been controlling this with Vaseline and Aquaphor products. She has not noticed any new breakouts in the last month since initiating the medication. The patient is fasting today. She is interested in being extra precautious on Accutane and initiating another birth control measure. She notes that she has previously gone on oral contraceptive in the past and experienced mild moodiness but would like to start one of these again.     Denies headaches, visual changes, epistaxis, confusion, fever, arthralgias, myalgias, abdominal pain, stool changes, hematochezia, mood changes, depression or suicidal ideations.      Past Medical History:   Denies family history of clotting disorders.  Admits to a family history of breast cancer in a few of her aunts.   Patient Active Problem List   Diagnosis     NO ACTIVE PROBLEMS     No past medical history on file.  No past surgical history on file.    Social History:  Social History     Socioeconomic History     Marital status: Single     Spouse name:  None     Number of children: None     Years of education: None     Highest education level: None   Occupational History     None   Social Needs     Financial resource strain: None     Food insecurity:     Worry: None     Inability: None     Transportation needs:     Medical: None     Non-medical: None   Tobacco Use     Smoking status: Never Smoker     Smokeless tobacco: Never Used   Substance and Sexual Activity     Alcohol use: No     Drug use: No     Sexual activity: No   Lifestyle     Physical activity:     Days per week: None     Minutes per session: None     Stress: None   Relationships     Social connections:     Talks on phone: None     Gets together: None     Attends [employer] service: None     Active member of club or organization: None     Attends meetings of clubs or organizations: None     Relationship status: None     Intimate partner violence:     Fear of current or ex partner: None     Emotionally abused: None     Physically abused: None     Forced sexual activity: None   Other Topics Concern     None   Social History Narrative     None       Family History:  No family history on file.    Medications:  Current Outpatient Medications   Medication Sig Dispense Refill     ISOtretinoin (ACCUTANE) 40 MG capsule Take 1 capsule (40 mg) by mouth daily with food 30 capsule 0     norgestim-eth estrad triphasic (ORTHO TRI-CYCLEN LO) 0.18/0.215/0.25 MG-25 MCG tablet Take 1 tablet by mouth daily 28 tablet 5       No Known Allergies    Review of Systems:  -Skin Establ Pt: The patient denies any new rash, pruritus, or lesions that are symptomatic, changing or bleeding, except as per HPI.  -Constitutional: The patient is feeling generally well.  The patient denies GI upset, abdominal pain, hematochezia,  headaches, blurred vision or dyspigmentation.    Physical exam:  Vitals: There were no vitals taken for this visit.  GEN: This is a well developed, well-nourished female in no acute distress, in a pleasant mood.     SKIN: Acne exam, which includes the face, neck, upper central chest, and upper central back was performed.    - Several closed comedone scattered to the lower face  - No large inflamatory papules  - She is wearing makeup today  - No other lesions of concern on areas examined.       Impression/Plan:  1. Acne vulgaris, on isotretinoin. Resilient despite increasing doses of spironolactone, oral and topical antibiotics and topical retinoids.   Start Ortho tri cyclin lo. Take one tablet po daily. Discussed risks of dvt, pe and indications to be evaluated or call the office. There is a family history of breast cancer but no blood clots or stroke. She is a non smoker. Discussed returning to OB/GYN in the future if considering a true long term contraception.   Increase isotretinoin to 60mg daily with food containing fat. Goal dose is 10,005- 14,674 mg (150-220mg/kg) for this 66.7 kg patient.   Method of contraception includes: abstinence.  Discussion of the risks and side effects of isotretinoin including but not limited to mucocutaneous dryness, arthralgias, myalgias, depression, suicidal ideation, headache, blurred vision, increase in liver function test and increase in lipids. The iPledge program brochure was provided and the contents discussed with the patient. The patient was counseled that they cannot give blood while on isotretinoin. Advised against tattoos and waxing. No personal or family history of inflammatory bowel disease or hypertriglyceridemia known to patient. Reviewed need to avoid alcohol on medication. The iPledge program consent was obtained. Patient counseled that if they wear contacts, the eyes may become too dry to tolerate. Recommend follow up with eye doctor if this occurs.    Discussed need for sun protection, at least SPF 30+.   Urine pregnancy test obtained today.   Labs: including qualitative hCG, CBC, CMP and fasting lipids will be obtained.   Patient's iPledge # is [id number].   Total dose:  18.0mg/kg    Follow-up in 1 month, earlier for new or changing lesions.       Staff Involved:  Scribe/Staff    Scribe Disclosure:   I, Darius Bunch, am serving as a scribe to document services personally performed by Suyapa Blake PA-C, based on data collection and the provider's statements to me.    Provider Disclosure:   The documentation recorded by the scribe accurately reflects the services I personally performed and the decisions made by me.    All risks, benefits and alternatives were discussed with patient.  Patient is in agreement and understands the assessment and plan.  All questions were answered.  Sun Screen Education was given.   Return to Clinic in 1 month or sooner as needed.   Suyapa Blake PA-C   [facility]

## 2019-12-30 ENCOUNTER — OFFICE VISIT (OUTPATIENT)
Dept: DERMATOLOGY | Facility: CLINIC | Age: 24
End: 2019-12-30
Payer: COMMERCIAL

## 2019-12-30 DIAGNOSIS — L70.0 ACNE VULGARIS: Primary | ICD-10-CM

## 2019-12-30 DIAGNOSIS — Z79.899 ON ISOTRETINOIN THERAPY: ICD-10-CM

## 2019-12-30 DIAGNOSIS — L70.0 ACNE VULGARIS: ICD-10-CM

## 2019-12-30 LAB
ALBUMIN SERPL-MCNC: 3.8 G/DL (ref 3.4–5)
ALP SERPL-CCNC: 39 U/L (ref 40–150)
ALT SERPL W P-5'-P-CCNC: 21 U/L (ref 0–50)
ANION GAP SERPL CALCULATED.3IONS-SCNC: 3 MMOL/L (ref 3–14)
AST SERPL W P-5'-P-CCNC: 35 U/L (ref 0–45)
BASOPHILS # BLD AUTO: 0 10E9/L (ref 0–0.2)
BASOPHILS NFR BLD AUTO: 0.9 %
BILIRUB SERPL-MCNC: 0.5 MG/DL (ref 0.2–1.3)
BUN SERPL-MCNC: 8 MG/DL (ref 7–30)
CALCIUM SERPL-MCNC: 9.2 MG/DL (ref 8.5–10.1)
CHLORIDE SERPL-SCNC: 106 MMOL/L (ref 94–109)
CO2 SERPL-SCNC: 28 MMOL/L (ref 20–32)
CREAT SERPL-MCNC: 0.67 MG/DL (ref 0.52–1.04)
DIFFERENTIAL METHOD BLD: NORMAL
EOSINOPHIL # BLD AUTO: 0.1 10E9/L (ref 0–0.7)
EOSINOPHIL NFR BLD AUTO: 1.1 %
ERYTHROCYTE [DISTWIDTH] IN BLOOD BY AUTOMATED COUNT: 12 % (ref 10–15)
GFR SERPL CREATININE-BSD FRML MDRD: >90 ML/MIN/{1.73_M2}
GLUCOSE SERPL-MCNC: 88 MG/DL (ref 70–99)
HCG UR QL: NEGATIVE
HCT VFR BLD AUTO: 37.1 % (ref 35–47)
HGB BLD-MCNC: 12.4 G/DL (ref 11.7–15.7)
IMM GRANULOCYTES # BLD: 0 10E9/L (ref 0–0.4)
IMM GRANULOCYTES NFR BLD: 0.2 %
LYMPHOCYTES # BLD AUTO: 1.3 10E9/L (ref 0.8–5.3)
LYMPHOCYTES NFR BLD AUTO: 28.9 %
MCH RBC QN AUTO: 31.4 PG (ref 26.5–33)
MCHC RBC AUTO-ENTMCNC: 33.4 G/DL (ref 31.5–36.5)
MCV RBC AUTO: 94 FL (ref 78–100)
MONOCYTES # BLD AUTO: 0.5 10E9/L (ref 0–1.3)
MONOCYTES NFR BLD AUTO: 10.2 %
NEUTROPHILS # BLD AUTO: 2.7 10E9/L (ref 1.6–8.3)
NEUTROPHILS NFR BLD AUTO: 58.7 %
NRBC # BLD AUTO: 0 10*3/UL
NRBC BLD AUTO-RTO: 0 /100
PLATELET # BLD AUTO: 250 10E9/L (ref 150–450)
POTASSIUM SERPL-SCNC: 4.5 MMOL/L (ref 3.4–5.3)
PROT SERPL-MCNC: 7.7 G/DL (ref 6.8–8.8)
RBC # BLD AUTO: 3.95 10E12/L (ref 3.8–5.2)
SODIUM SERPL-SCNC: 137 MMOL/L (ref 133–144)
TRIGL SERPL-MCNC: 52 MG/DL
WBC # BLD AUTO: 4.5 10E9/L (ref 4–11)

## 2019-12-30 RX ORDER — ISOTRETINOIN 30 MG/1
60 CAPSULE ORAL
Qty: 60 CAPSULE | Refills: 0 | Status: SHIPPED | OUTPATIENT
Start: 2019-12-30 | End: 2020-01-27

## 2019-12-30 ASSESSMENT — PAIN SCALES - GENERAL: PAINLEVEL: NO PAIN (0)

## 2019-12-30 NOTE — LETTER
12/30/2019      RE: Yaritza Vuong   [address] [address] [id number]       [facility] Dermatology Note      Dermatology Problem List:  1. Acne vulgaris   - initiated Accutane 40 mg daily 10/28/19, increased to 60 mg daily 11/25/19, current 60 mg daily 12/30/19  - s/p minocycline 50 mg, tretinoin 0.025% cream, clindamycin 1% gel, spironolactone 150 mg    Encounter Date: Dec 30, 2019    CC:  Chief Complaint   Patient presents with     Accutane     Yaritza is here today for an acctane follow up.          History of Present Illness:  Ms. Yaritza Vuong  is a 24 year old female who presents today for acne follow up. She was last seen in the dermatology clinic on 11/25/19 for acne vulgaris when she increased to Accutane 60 mg daily. Today she is at the end of month #2.    She reports that she has been experiencing lip dryness in the last week and intermittent nose bleeds if she blows her nose a lot. She has continued to have breakouts but notes that they are fewer in number, less severe, and have resolved quicker. Denies acne of the chest or back. She has controlled her facial dryness with frequent use of moisturizer.The patient is fasting today. Ortho Tri Cyclen lo has worked well for her and she is interested in continuing its use.     Denies headaches, visual changes, epistaxis, confusion, fever, arthralgias, myalgias, abdominal pain, stool changes, hematochezia, mood changes, depression or suicidal ideations.      Past Medical History:   Denies family history of clotting disorders.  Admits to a family history of breast cancer in a few of her aunts.   Patient Active Problem List   Diagnosis     NO ACTIVE PROBLEMS     No past medical history on file.  No past surgical history on file.    Social History:  Social History     Socioeconomic History     Marital status: Single     Spouse name: None     Number of children: None     Years of education: None     Highest education level: None    Occupational History     None   Social Needs     Financial resource strain: None     Food insecurity:     Worry: None     Inability: None     Transportation needs:     Medical: None     Non-medical: None   Tobacco Use     Smoking status: Never Smoker     Smokeless tobacco: Never Used   Substance and Sexual Activity     Alcohol use: No     Drug use: No     Sexual activity: No   Lifestyle     Physical activity:     Days per week: None     Minutes per session: None     Stress: None   Relationships     Social connections:     Talks on phone: None     Gets together: None     Attends Synagogue service: None     Active member of club or organization: None     Attends meetings of clubs or organizations: None     Relationship status: None     Intimate partner violence:     Fear of current or ex partner: None     Emotionally abused: None     Physically abused: None     Forced sexual activity: None   Other Topics Concern     None   Social History Narrative     None       Family History:  No family history on file.    Medications:  Current Outpatient Medications   Medication Sig Dispense Refill     ISOtretinoin (ABSORICA) 30 MG capsule Take 2 capsules (60 mg) by mouth daily with food 60 capsule 0     norgestim-eth estrad triphasic (ORTHO TRI-CYCLEN LO) 0.18/0.215/0.25 MG-25 MCG tablet Take 1 tablet by mouth daily 28 tablet 5       No Known Allergies    Review of Systems:  -Skin Establ Pt: The patient denies any new rash, pruritus, or lesions that are symptomatic, changing or bleeding, except as per HPI.  -Constitutional: The patient is feeling generally well.  The patient denies GI upset, abdominal pain, hematochezia,  headaches, blurred vision or dyspigmentation.    Physical exam:  Vitals: There were no vitals taken for this visit.  GEN: This is a well developed, well-nourished female in no acute distress, in a pleasant mood.    SKIN: Sun-exposed skin, which includes the head/face, neck, both arms, digits, and/or nails was  examined.     - Very few superficial acneiforms on the left lower cheek  - Pt was wearing makeup  - Rolling acne scarring on the lower face  - No other lesions of concern on areas examined.       Impression/Plan:  1. Acne vulgaris, on isotretinoin. Resilient despite increasing doses of spironolactone, oral and topical antibiotics and topical retinoids.   Continue Ortho tri cyclin lo. Take one tablet po daily. Discussed risks of dvt, pe and indications to be evaluated or call the office. There is a family history of breast cancer but no blood clots or stroke. She is a non smoker. Discussed returning to OB/GYN in the future if considering a true long term contraception.   Continue isotretinoin 60mg daily 12/30/19 with food containing fat. Goal dose is 10,005- 14,674 mg (150-220mg/kg) for this 66.7 kg patient.   Method of contraception includes: abstinence. She is also on oral contraceptive pills.  Discussion of the risks and side effects of isotretinoin including but not limited to mucocutaneous dryness, arthralgias, myalgias, depression, suicidal ideation, headache, blurred vision, increase in liver function test and increase in lipids. The iPledge program brochure was provided and the contents discussed with the patient. The patient was counseled that they cannot give blood while on isotretinoin. Advised against tattoos and waxing. No personal or family history of inflammatory bowel disease or hypertriglyceridemia known to patient. Reviewed need to avoid alcohol on medication. The iPledge program consent was obtained. Patient counseled that if they wear contacts, the eyes may become too dry to tolerate. Recommend follow up with eye doctor if this occurs.    Discussed need for sun protection, at least SPF 30+.   Urine pregnancy test obtained today.   Labs: including qualitative hCG, CBC, CMP and fasting lipids will be obtained.   Patient's iPledge # is [id number].   Total dose: 3,000 mg for a weight based dose of  45.0mg/kg    Follow-up in 1 month, earlier for new or changing lesions.       Staff Involved:  Scribe/Staff    Scribe Disclosure:   I, Darius Bunch, am serving as a scribe to document services personally performed by Suyapa Blake PA-C, based on data collection and the provider's statements to me.    Provider Disclosure:   The documentation recorded by the scribe accurately reflects the services I personally performed and the decisions made by me.    All risks, benefits and alternatives were discussed with patient.  Patient is in agreement and understands the assessment and plan.  All questions were answered.  Sun Screen Education was given.   Return to Clinic annually or sooner as needed.   Suyapa Blake PA-C   [facility]

## 2019-12-30 NOTE — LETTER
12/30/2019       RE: Yaritza Vuong   [address] [facility] [address]     Dear Colleague,    Thank you for referring your patient, Yaritza Vuong , to the [facility] DERMATOLOGY at [facility]. Please see a copy of my visit note below.    [facility] Dermatology Note      Dermatology Problem List:  1. Acne vulgaris   - initiated Accutane 40 mg daily 10/28/19, increased to 60 mg daily 11/25/19, current 60 mg daily 12/30/19  - s/p minocycline 50 mg, tretinoin 0.025% cream, clindamycin 1% gel, spironolactone 150 mg    Encounter Date: Dec 30, 2019    CC:  Chief Complaint   Patient presents with     Sharmila Vigil is here today for an acctane follow up.          History of Present Illness:  Ms. Yaritza Vuong  is a 24 year old female who presents today for acne follow up. She was last seen in the dermatology clinic on 11/25/19 for acne vulgaris when she increased to Accutane 60 mg daily. Today she is at the end of month #2.    She reports that she has been experiencing lip dryness in the last week and intermittent nose bleeds if she blows her nose a lot. She has continued to have breakouts but notes that they are fewer in number, less severe, and have resolved quicker. Denies acne of the chest or back. She has controlled her facial dryness with frequent use of moisturizer.The patient is fasting today. Ortho Tri Cyclen lo has worked well for her and she is interested in continuing its use.     Denies headaches, visual changes, epistaxis, confusion, fever, arthralgias, myalgias, abdominal pain, stool changes, hematochezia, mood changes, depression or suicidal ideations.      Past Medical History:   Denies family history of clotting disorders.  Admits to a family history of breast cancer in a few of her aunts.   Patient Active Problem List   Diagnosis     NO ACTIVE PROBLEMS     No past medical history on file.  No past surgical history on  file.    Social History:  Social History     Socioeconomic History     Marital status: Single     Spouse name: None     Number of children: None     Years of education: None     Highest education level: None   Occupational History     None   Social Needs     Financial resource strain: None     Food insecurity:     Worry: None     Inability: None     Transportation needs:     Medical: None     Non-medical: None   Tobacco Use     Smoking status: Never Smoker     Smokeless tobacco: Never Used   Substance and Sexual Activity     Alcohol use: No     Drug use: No     Sexual activity: No   Lifestyle     Physical activity:     Days per week: None     Minutes per session: None     Stress: None   Relationships     Social connections:     Talks on phone: None     Gets together: None     Attends Oriental orthodox service: None     Active member of club or organization: None     Attends meetings of clubs or organizations: None     Relationship status: None     Intimate partner violence:     Fear of current or ex partner: None     Emotionally abused: None     Physically abused: None     Forced sexual activity: None   Other Topics Concern     None   Social History Narrative     None       Family History:  No family history on file.    Medications:  Current Outpatient Medications   Medication Sig Dispense Refill     ISOtretinoin (ABSORICA) 30 MG capsule Take 2 capsules (60 mg) by mouth daily with food 60 capsule 0     norgestim-eth estrad triphasic (ORTHO TRI-CYCLEN LO) 0.18/0.215/0.25 MG-25 MCG tablet Take 1 tablet by mouth daily 28 tablet 5       No Known Allergies    Review of Systems:  -Skin Establ Pt: The patient denies any new rash, pruritus, or lesions that are symptomatic, changing or bleeding, except as per HPI.  -Constitutional: The patient is feeling generally well.  The patient denies GI upset, abdominal pain, hematochezia,  headaches, blurred vision or dyspigmentation.    Physical exam:  Vitals: There were no vitals taken for  this visit.  GEN: This is a well developed, well-nourished female in no acute distress, in a pleasant mood.    SKIN: Sun-exposed skin, which includes the head/face, neck, both arms, digits, and/or nails was examined.     - Very few superficial acneiforms on the left lower cheek  - Pt was wearing makeup  - Rolling acne scarring on the lower face  - No other lesions of concern on areas examined.       Impression/Plan:  1. Acne vulgaris, on isotretinoin. Resilient despite increasing doses of spironolactone, oral and topical antibiotics and topical retinoids.   Continue Ortho tri cyclin lo. Take one tablet po daily. Discussed risks of dvt, pe and indications to be evaluated or call the office. There is a family history of breast cancer but no blood clots or stroke. She is a non smoker. Discussed returning to OB/GYN in the future if considering a true long term contraception.   Continue isotretinoin 60mg daily 12/30/19 with food containing fat. Goal dose is 10,005- 14,674 mg (150-220mg/kg) for this 66.7 kg patient.   Method of contraception includes: abstinence. She is also on oral contraceptive pills.  Discussion of the risks and side effects of isotretinoin including but not limited to mucocutaneous dryness, arthralgias, myalgias, depression, suicidal ideation, headache, blurred vision, increase in liver function test and increase in lipids. The iPledge program brochure was provided and the contents discussed with the patient. The patient was counseled that they cannot give blood while on isotretinoin. Advised against tattoos and waxing. No personal or family history of inflammatory bowel disease or hypertriglyceridemia known to patient. Reviewed need to avoid alcohol on medication. The iPledge program consent was obtained. Patient counseled that if they wear contacts, the eyes may become too dry to tolerate. Recommend follow up with eye doctor if this occurs.    Discussed need for sun protection, at least SPF 30+.    Urine pregnancy test obtained today.   Labs: including qualitative hCG, CBC, CMP and fasting lipids will be obtained.   Patient's iPledge # is [id number].   Total dose: 3,000 mg for a weight based dose of 45.0mg/kg    Follow-up in 1 month, earlier for new or changing lesions.       Staff Involved:  Scribe/Staff    Scribe Disclosure:   I, Darius Bunch, am serving as a scribe to document services personally performed by Suyapa Blake PA-C, based on data collection and the provider's statements to me.    Provider Disclosure:   The documentation recorded by the scribe accurately reflects the services I personally performed and the decisions made by me.    All risks, benefits and alternatives were discussed with patient.  Patient is in agreement and understands the assessment and plan.  All questions were answered.  Sun Screen Education was given.   Return to Clinic annually or sooner as needed.   Suyapa Blake PA-C   [facility]     Again, thank you for allowing me to participate in the care of your patient.      Sincerely,    Suyapa Blake PA-C

## 2020-01-27 ENCOUNTER — OFFICE VISIT (OUTPATIENT)
Dept: DERMATOLOGY | Facility: CLINIC | Age: 25
End: 2020-01-27
Payer: COMMERCIAL

## 2020-01-27 DIAGNOSIS — L70.0 ACNE VULGARIS: ICD-10-CM

## 2020-01-27 DIAGNOSIS — Z79.899 ON ISOTRETINOIN THERAPY: ICD-10-CM

## 2020-01-27 DIAGNOSIS — L70.0 ACNE VULGARIS: Primary | ICD-10-CM

## 2020-01-27 LAB
ALT SERPL W P-5'-P-CCNC: 21 U/L (ref 0–50)
AST SERPL W P-5'-P-CCNC: 19 U/L (ref 0–45)
HCG UR QL: NEGATIVE
TRIGL SERPL-MCNC: 56 MG/DL

## 2020-01-27 RX ORDER — ISOTRETINOIN 30 MG/1
60 CAPSULE ORAL
Qty: 60 CAPSULE | Refills: 0 | Status: SHIPPED | OUTPATIENT
Start: 2020-01-27 | End: 2020-03-02

## 2020-01-27 ASSESSMENT — PAIN SCALES - GENERAL: PAINLEVEL: NO PAIN (0)

## 2020-01-27 NOTE — PROGRESS NOTES
Dermatology Rooming Note    Yaritza Vuong 's goals for this visit include:   Chief Complaint   Patient presents with     Derm Problem     Accutane,  no concerns noted.      Trina Alvarenga LPN

## 2020-01-27 NOTE — LETTER
1/27/2020       RE: Yaritza Vuong   [address] [facility] [address]     Dear Colleague,    Thank you for referring your patient, Yaritza Vuong , to the [facility] DERMATOLOGY at [facility]. Please see a copy of my visit note below.    [facility] Dermatology Note      Dermatology Problem List:  1. Acne vulgaris   - initiated Accutane 40 mg daily 10/28/19, increased to 60 mg daily 11/25/19, current 60 mg daily   - s/p minocycline 50 mg, tretinoin 0.025% cream, clindamycin 1% gel, spironolactone 150 mg    Encounter Date: Jan 27, 2020    CC:  Chief Complaint   Patient presents with     Derm Problem     Accutane,  no concerns noted.          History of Present Illness:  Ms. Yaritza Vuong  is a 24 year old female who presents today for acne follow up. She was last seen in the dermatology clinic on 12/30/19 for acne vulgaris when she continued Accutane 60 mg daily with food. Today she is at the end of month #3.    She reports that she has continued to have dryness but has not had any more bloody noses in the past month. She has continued to have fewer breakouts in the past month.     Denies headaches, visual changes, epistaxis, confusion, fever, arthralgias, myalgias, abdominal pain, stool changes, hematochezia, mood changes, depression or suicidal ideations.      Past Medical History:   Denies family history of clotting disorders.  Admits to a family history of breast cancer in a few of her aunts.   Patient Active Problem List   Diagnosis     NO ACTIVE PROBLEMS     No past medical history on file.  No past surgical history on file.    Social History:  Social History     Socioeconomic History     Marital status: Single     Spouse name: None     Number of children: None     Years of education: None     Highest education level: None   Occupational History     None   Social Needs     Financial resource strain: None     Food insecurity:      Worry: None     Inability: None     Transportation needs:     Medical: None     Non-medical: None   Tobacco Use     Smoking status: Never Smoker     Smokeless tobacco: Never Used   Substance and Sexual Activity     Alcohol use: No     Drug use: No     Sexual activity: No   Lifestyle     Physical activity:     Days per week: None     Minutes per session: None     Stress: None   Relationships     Social connections:     Talks on phone: None     Gets together: None     Attends Taoism service: None     Active member of club or organization: None     Attends meetings of clubs or organizations: None     Relationship status: None     Intimate partner violence:     Fear of current or ex partner: None     Emotionally abused: None     Physically abused: None     Forced sexual activity: None   Other Topics Concern     None   Social History Narrative     None       Family History:  No family history on file.    Medications:  Current Outpatient Medications   Medication Sig Dispense Refill     ISOtretinoin (ABSORICA) 30 MG capsule Take 2 capsules (60 mg) by mouth daily with food 60 capsule 0     norgestim-eth estrad triphasic (ORTHO TRI-CYCLEN LO) 0.18/0.215/0.25 MG-25 MCG tablet Take 1 tablet by mouth daily 28 tablet 5       No Known Allergies    Review of Systems:  -Skin Establ Pt: The patient denies any new rash, pruritus, or lesions that are symptomatic, changing or bleeding, except as per HPI.  -Constitutional: The patient is feeling generally well.  The patient denies GI upset, abdominal pain, hematochezia,  headaches, blurred vision or dyspigmentation.    Physical exam:  Vitals: There were no vitals taken for this visit.  GEN: This is a well developed, well-nourished female in no acute distress, in a pleasant mood.    SKIN: Sun-exposed skin, which includes the head/face, neck, both arms, digits, and/or nails was examined.     - Pink macles to cheeks and lower face without any acneiforms  - Pt was wearing makeup  -  Rolling acne scarring on the lower face  - No other lesions of concern on areas examined.       Impression/Plan:  1. Acne vulgaris, on isotretinoin. Resilient despite increasing doses of spironolactone, oral and topical antibiotics and topical retinoids.   Continue Ortho tri cyclin lo. Take one tablet po daily. Discussed risks of dvt, pe and indications to be evaluated or call the office. There is a family history of breast cancer but no blood clots or stroke. She is a non smoker. Discussed returning to OB/GYN in the future if considering a true long term contraception.   Continue isotretinoin 60mg daily 1/27/20 with food containing fat. Goal dose is 10,005- 14,674 mg (150-220mg/kg) for this 66.7 kg patient.   Method of contraception includes: abstinence. She is also on oral contraceptive pills.  Discussion of the risks and side effects of isotretinoin including but not limited to mucocutaneous dryness, arthralgias, myalgias, depression, suicidal ideation, headache, blurred vision, increase in liver function test and increase in lipids. The iPledge program brochure was provided and the contents discussed with the patient. The patient was counseled that they cannot give blood while on isotretinoin. Advised against tattoos and waxing. No personal or family history of inflammatory bowel disease or hypertriglyceridemia known to patient. Reviewed need to avoid alcohol on medication. The iPledge program consent was obtained. Patient counseled that if they wear contacts, the eyes may become too dry to tolerate. Recommend follow up with eye doctor if this occurs.    Discussed need for sun protection, at least SPF 30+.   Urine pregnancy test obtained today.   Labs: including qualitative hCG, AST, ALT, and fasting Triglycerides will be obtained. These were all wnl. No need for further lab monitoring except monthly pregnancy tests.   Patient's iPledge # is [id number].   Total dose: 4,800 mg for a weight based dose of  72.0mg/kg    Follow-up in 1 month, earlier for new or changing lesions.       Staff Involved:  Scribe/Staff    Scribe Disclosure:   I, Darius Bunch, am serving as a scribe to document services personally performed by Suyapa Blake PA-C, based on data collection and the provider's statements to me.    Provider Disclosure:   The documentation recorded by the scribe accurately reflects the services I personally performed and the decisions made by me.    All risks, benefits and alternatives were discussed with patient.  Patient is in agreement and understands the assessment and plan.  All questions were answered.  Sun Screen Education was given.   Return to Clinic annually or sooner as needed.   Suyapa Blake PA-C   [facility]     Dermatology Rooming Note    Yaritza Vuong 's goals for this visit include:   Chief Complaint   Patient presents with     Derm Problem     Accutane,  no concerns noted.      Trina Alvarenga LPN      Again, thank you for allowing me to participate in the care of your patient.      Sincerely,    Suyapa Blake PA-C

## 2020-01-27 NOTE — PROGRESS NOTES
[facility] Dermatology Note      Dermatology Problem List:  1. Acne vulgaris   - initiated Accutane 40 mg daily 10/28/19, increased to 60 mg daily 11/25/19, current 60 mg daily   - s/p minocycline 50 mg, tretinoin 0.025% cream, clindamycin 1% gel, spironolactone 150 mg    Encounter Date: Jan 27, 2020    CC:  Chief Complaint   Patient presents with     Derm Problem     Accutane,  no concerns noted.          History of Present Illness:  Ms. Yaritza Vuong  is a 24 year old female who presents today for acne follow up. She was last seen in the dermatology clinic on 12/30/19 for acne vulgaris when she continued Accutane 60 mg daily with food. Today she is at the end of month #3.    She reports that she has continued to have dryness but has not had any more bloody noses in the past month. She has continued to have fewer breakouts in the past month.     Denies headaches, visual changes, epistaxis, confusion, fever, arthralgias, myalgias, abdominal pain, stool changes, hematochezia, mood changes, depression or suicidal ideations.      Past Medical History:   Denies family history of clotting disorders.  Admits to a family history of breast cancer in a few of her aunts.   Patient Active Problem List   Diagnosis     NO ACTIVE PROBLEMS     No past medical history on file.  No past surgical history on file.    Social History:  Social History     Socioeconomic History     Marital status: Single     Spouse name: None     Number of children: None     Years of education: None     Highest education level: None   Occupational History     None   Social Needs     Financial resource strain: None     Food insecurity:     Worry: None     Inability: None     Transportation needs:     Medical: None     Non-medical: None   Tobacco Use     Smoking status: Never Smoker     Smokeless tobacco: Never Used   Substance and Sexual Activity     Alcohol use: No     Drug use: No     Sexual activity: No   Lifestyle      Physical activity:     Days per week: None     Minutes per session: None     Stress: None   Relationships     Social connections:     Talks on phone: None     Gets together: None     Attends Sabianist service: None     Active member of club or organization: None     Attends meetings of clubs or organizations: None     Relationship status: None     Intimate partner violence:     Fear of current or ex partner: None     Emotionally abused: None     Physically abused: None     Forced sexual activity: None   Other Topics Concern     None   Social History Narrative     None       Family History:  No family history on file.    Medications:  Current Outpatient Medications   Medication Sig Dispense Refill     ISOtretinoin (ABSORICA) 30 MG capsule Take 2 capsules (60 mg) by mouth daily with food 60 capsule 0     norgestim-eth estrad triphasic (ORTHO TRI-CYCLEN LO) 0.18/0.215/0.25 MG-25 MCG tablet Take 1 tablet by mouth daily 28 tablet 5       No Known Allergies    Review of Systems:  -Skin Establ Pt: The patient denies any new rash, pruritus, or lesions that are symptomatic, changing or bleeding, except as per HPI.  -Constitutional: The patient is feeling generally well.  The patient denies GI upset, abdominal pain, hematochezia,  headaches, blurred vision or dyspigmentation.    Physical exam:  Vitals: There were no vitals taken for this visit.  GEN: This is a well developed, well-nourished female in no acute distress, in a pleasant mood.    SKIN: Sun-exposed skin, which includes the head/face, neck, both arms, digits, and/or nails was examined.     - Pink macles to cheeks and lower face without any acneiforms  - Pt was wearing makeup  - Rolling acne scarring on the lower face  - No other lesions of concern on areas examined.       Impression/Plan:  1. Acne vulgaris, on isotretinoin. Resilient despite increasing doses of spironolactone, oral and topical antibiotics and topical retinoids.   Continue Ortho tri cyclin lo. Take  one tablet po daily. Discussed risks of dvt, pe and indications to be evaluated or call the office. There is a family history of breast cancer but no blood clots or stroke. She is a non smoker. Discussed returning to OB/GYN in the future if considering a true long term contraception.   Continue isotretinoin 60mg daily 1/27/20 with food containing fat. Goal dose is 10,005- 14,674 mg (150-220mg/kg) for this 66.7 kg patient.   Method of contraception includes: abstinence. She is also on oral contraceptive pills.  Discussion of the risks and side effects of isotretinoin including but not limited to mucocutaneous dryness, arthralgias, myalgias, depression, suicidal ideation, headache, blurred vision, increase in liver function test and increase in lipids. The iPledge program brochure was provided and the contents discussed with the patient. The patient was counseled that they cannot give blood while on isotretinoin. Advised against tattoos and waxing. No personal or family history of inflammatory bowel disease or hypertriglyceridemia known to patient. Reviewed need to avoid alcohol on medication. The iPledge program consent was obtained. Patient counseled that if they wear contacts, the eyes may become too dry to tolerate. Recommend follow up with eye doctor if this occurs.    Discussed need for sun protection, at least SPF 30+.   Urine pregnancy test obtained today.   Labs: including qualitative hCG, AST, ALT, and fasting Triglycerides will be obtained. These were all wnl. No need for further lab monitoring except monthly pregnancy tests.   Patient's iPledge # is [id number].   Total dose: 4,800 mg for a weight based dose of 72.0mg/kg    Follow-up in 1 month, earlier for new or changing lesions.       Staff Involved:  Scribe/Staff    Scribe Disclosure:   Darius MENDOZA, am serving as a scribe to document services personally performed by Suyapa Blake PA-C, based on data collection and the provider's statements to  me.    Provider Disclosure:   The documentation recorded by the scribe accurately reflects the services I personally performed and the decisions made by me.    All risks, benefits and alternatives were discussed with patient.  Patient is in agreement and understands the assessment and plan.  All questions were answered.  Sun Screen Education was given.   Return to Clinic annually or sooner as needed.   Suyapa Blake PA-C   [facility]

## 2020-03-02 ENCOUNTER — OFFICE VISIT (OUTPATIENT)
Dept: DERMATOLOGY | Facility: CLINIC | Age: 25
End: 2020-03-02
Payer: COMMERCIAL

## 2020-03-02 DIAGNOSIS — Z79.899 ON ISOTRETINOIN THERAPY: ICD-10-CM

## 2020-03-02 DIAGNOSIS — L70.0 ACNE VULGARIS: ICD-10-CM

## 2020-03-02 DIAGNOSIS — L70.0 ACNE VULGARIS: Primary | ICD-10-CM

## 2020-03-02 LAB — HCG UR QL: NEGATIVE

## 2020-03-02 RX ORDER — ISOTRETINOIN 30 MG/1
60 CAPSULE ORAL
Qty: 60 CAPSULE | Refills: 0 | Status: SHIPPED | OUTPATIENT
Start: 2020-03-02 | End: 2020-04-06

## 2020-03-02 ASSESSMENT — PAIN SCALES - GENERAL: PAINLEVEL: NO PAIN (0)

## 2020-03-02 NOTE — LETTER
3/2/2020       RE: Yaritza Vuong   [id number] [facility] [address]     Dear Colleague,    Thank you for referring your patient, Yaritza Vuong , to the [facility] at [facility]. Please see a copy of my visit note below.    [facility] Dermatology Note      Dermatology Problem List:  1. Acne vulgaris   - initiated Accutane 40 mg daily 10/28/19, increased to 60 mg daily 11/25/19, current 60 mg daily   - s/p minocycline 50 mg, tretinoin 0.025% cream, clindamycin 1% gel, spironolactone 150 mg    Encounter Date: Mar 2, 2020    CC:  Chief Complaint   Patient presents with     Derm Problem     Accutane, no concerns noted.          History of Present Illness:  Ms. Yaritza Vuong  is a 24 year old female who presents today for acne follow up. She was last seen in the dermatology clinic on 1/27/30 for acne vulgaris when she continued Accutane 60 mg daily with food. Today she is at the end of month #4.    Today she notes that she only had a few isolated acneiforms on the face in the last month. Her chest and back have continued to be clear of acne. She has not experienced excessive facial dryness and is happy on the current dose of medication.    Denies headaches, visual changes, epistaxis, confusion, fever, arthralgias, myalgias, abdominal pain, stool changes, hematochezia, mood changes, depression or suicidal ideations.      Past Medical History:   Denies family history of clotting disorders.  Admits to a family history of breast cancer in a few of her aunts.   Patient Active Problem List   Diagnosis     NO ACTIVE PROBLEMS     No past medical history on file.  No past surgical history on file.    Social History:  Social History     Socioeconomic History     Marital status: Single     Spouse name: None     Number of children: None     Years of education: None     Highest education level: None   Occupational History     None   Social  Needs     Financial resource strain: None     Food insecurity:     Worry: None     Inability: None     Transportation needs:     Medical: None     Non-medical: None   Tobacco Use     Smoking status: Never Smoker     Smokeless tobacco: Never Used   Substance and Sexual Activity     Alcohol use: No     Drug use: No     Sexual activity: No   Lifestyle     Physical activity:     Days per week: None     Minutes per session: None     Stress: None   Relationships     Social connections:     Talks on phone: None     Gets together: None     Attends Zoroastrianism service: None     Active member of club or organization: None     Attends meetings of clubs or organizations: None     Relationship status: None     Intimate partner violence:     Fear of current or ex partner: None     Emotionally abused: None     Physically abused: None     Forced sexual activity: None   Other Topics Concern     None   Social History Narrative     None       Family History:  No family history on file.    Medications:  Current Outpatient Medications   Medication Sig Dispense Refill     ISOtretinoin (ABSORICA) 30 MG capsule Take 2 capsules (60 mg) by mouth daily with food 60 capsule 0     norgestim-eth estrad triphasic (ORTHO TRI-CYCLEN LO) 0.18/0.215/0.25 MG-25 MCG tablet Take 1 tablet by mouth daily 28 tablet 5       No Known Allergies    Review of Systems:  -Skin Establ Pt: The patient denies any new rash, pruritus, or lesions that are symptomatic, changing or bleeding, except as per HPI.  -Constitutional: The patient is feeling generally well.  The patient denies GI upset, abdominal pain, hematochezia,  headaches, blurred vision or dyspigmentation.    Physical exam:  Vitals: There were no vitals taken for this visit.  GEN: This is a well developed, well-nourished female in no acute distress, in a pleasant mood.    SKIN: Sun-exposed skin, which includes the head/face, neck, both arms, digits, and/or nails was examined.     - Resolving pink papule on  the left lateral cheek  - Pt was wearing makeup  - Rolling acne scarring on the lower face  - No other lesions of concern on areas examined.       Impression/Plan:  1. Acne vulgaris, on isotretinoin. Hx of resilience despite increasing doses of spironolactone, oral and topical antibiotics and topical retinoids.   Continue Ortho tri cyclin lo. Take one tablet po daily. Discussed risks of dvt, pe and indications to be evaluated or call the office. There is a family history of breast cancer but no blood clots or stroke. She is a non smoker. Discussed returning to OB/GYN in the future if considering a true long term contraception.   Continue isotretinoin 60mg daily 3/2/20 with food containing fat. Goal dose is 10,005- 14,674 mg (150-220mg/kg) for this 66.7 kg patient.   Method of contraception includes: abstinence. She is also on oral contraceptive pills.  Discussion of the risks and side effects of isotretinoin including but not limited to mucocutaneous dryness, arthralgias, myalgias, depression, suicidal ideation, headache, blurred vision, increase in liver function test and increase in lipids. The iPledge program brochure was provided and the contents discussed with the patient. The patient was counseled that they cannot give blood while on isotretinoin. Advised against tattoos and waxing. No personal or family history of inflammatory bowel disease or hypertriglyceridemia known to patient. Reviewed need to avoid alcohol on medication. The iPledge program consent was obtained. Patient counseled that if they wear contacts, the eyes may become too dry to tolerate. Recommend follow up with eye doctor if this occurs.    Discussed need for sun protection, at least SPF 30+.   Urine pregnancy test obtained today.   Labs: including qualitative hCG, previous labs including AST, ALT, and fasting Triglycerides were all wnl. No need for further lab monitoring except monthly pregnancy tests.   Patient's iPledge # is [id number].    Total dose: 6,600 mg for a weight based dose of 99.0mg/kg    Follow-up in 1 month, earlier for new or changing lesions.       Staff Involved:  Scribe/Staff    Scribe Disclosure:   I, Darius Bunch, am serving as a scribe to document services personally performed by Suyapa Blake PA-C, based on data collection and the provider's statements to me.    Provider Disclosure:   The documentation recorded by the scribe accurately reflects the services I personally performed and the decisions made by me.    All risks, benefits and alternatives were discussed with patient.  Patient is in agreement and understands the assessment and plan.  All questions were answered.  Sun Screen Education was given.   Return to Clinic in 1 month or sooner as needed.   Suyapa Blake PA-C   [facility]

## 2020-03-02 NOTE — PROGRESS NOTES
[facility] Dermatology Note      Dermatology Problem List:  1. Acne vulgaris   - initiated Accutane 40 mg daily 10/28/19, increased to 60 mg daily 11/25/19, current 60 mg daily   - s/p minocycline 50 mg, tretinoin 0.025% cream, clindamycin 1% gel, spironolactone 150 mg    Encounter Date: Mar 2, 2020    CC:  Chief Complaint   Patient presents with     Derm Problem     Accutane, no concerns noted.          History of Present Illness:  Ms. Yaritza Vuong  is a 24 year old female who presents today for acne follow up. She was last seen in the dermatology clinic on 1/27/30 for acne vulgaris when she continued Accutane 60 mg daily with food. Today she is at the end of month #4.    Today she notes that she only had a few isolated acneiforms on the face in the last month. Her chest and back have continued to be clear of acne. She has not experienced excessive facial dryness and is happy on the current dose of medication.    Denies headaches, visual changes, epistaxis, confusion, fever, arthralgias, myalgias, abdominal pain, stool changes, hematochezia, mood changes, depression or suicidal ideations.      Past Medical History:   Denies family history of clotting disorders.  Admits to a family history of breast cancer in a few of her aunts.   Patient Active Problem List   Diagnosis     NO ACTIVE PROBLEMS     No past medical history on file.  No past surgical history on file.    Social History:  Social History     Socioeconomic History     Marital status: Single     Spouse name: None     Number of children: None     Years of education: None     Highest education level: None   Occupational History     None   Social Needs     Financial resource strain: None     Food insecurity:     Worry: None     Inability: None     Transportation needs:     Medical: None     Non-medical: None   Tobacco Use     Smoking status: Never Smoker     Smokeless tobacco: Never Used   Substance and Sexual Activity     Alcohol  use: No     Drug use: No     Sexual activity: No   Lifestyle     Physical activity:     Days per week: None     Minutes per session: None     Stress: None   Relationships     Social connections:     Talks on phone: None     Gets together: None     Attends Gnosticist service: None     Active member of club or organization: None     Attends meetings of clubs or organizations: None     Relationship status: None     Intimate partner violence:     Fear of current or ex partner: None     Emotionally abused: None     Physically abused: None     Forced sexual activity: None   Other Topics Concern     None   Social History Narrative     None       Family History:  No family history on file.    Medications:  Current Outpatient Medications   Medication Sig Dispense Refill     ISOtretinoin (ABSORICA) 30 MG capsule Take 2 capsules (60 mg) by mouth daily with food 60 capsule 0     norgestim-eth estrad triphasic (ORTHO TRI-CYCLEN LO) 0.18/0.215/0.25 MG-25 MCG tablet Take 1 tablet by mouth daily 28 tablet 5       No Known Allergies    Review of Systems:  -Skin Establ Pt: The patient denies any new rash, pruritus, or lesions that are symptomatic, changing or bleeding, except as per HPI.  -Constitutional: The patient is feeling generally well.  The patient denies GI upset, abdominal pain, hematochezia,  headaches, blurred vision or dyspigmentation.    Physical exam:  Vitals: There were no vitals taken for this visit.  GEN: This is a well developed, well-nourished female in no acute distress, in a pleasant mood.    SKIN: Sun-exposed skin, which includes the head/face, neck, both arms, digits, and/or nails was examined.     - Resolving pink papule on the left lateral cheek  - Pt was wearing makeup  - Rolling acne scarring on the lower face  - No other lesions of concern on areas examined.       Impression/Plan:  1. Acne vulgaris, on isotretinoin. Hx of resilience despite increasing doses of spironolactone, oral and topical antibiotics  and topical retinoids.   Continue Ortho tri cyclin lo. Take one tablet po daily. Discussed risks of dvt, pe and indications to be evaluated or call the office. There is a family history of breast cancer but no blood clots or stroke. She is a non smoker. Discussed returning to OB/GYN in the future if considering a true long term contraception.   Continue isotretinoin 60mg daily 3/2/20 with food containing fat. Goal dose is 10,005- 14,674 mg (150-220mg/kg) for this 66.7 kg patient.   Method of contraception includes: abstinence. She is also on oral contraceptive pills.  Discussion of the risks and side effects of isotretinoin including but not limited to mucocutaneous dryness, arthralgias, myalgias, depression, suicidal ideation, headache, blurred vision, increase in liver function test and increase in lipids. The iPledge program brochure was provided and the contents discussed with the patient. The patient was counseled that they cannot give blood while on isotretinoin. Advised against tattoos and waxing. No personal or family history of inflammatory bowel disease or hypertriglyceridemia known to patient. Reviewed need to avoid alcohol on medication. The iPledge program consent was obtained. Patient counseled that if they wear contacts, the eyes may become too dry to tolerate. Recommend follow up with eye doctor if this occurs.    Discussed need for sun protection, at least SPF 30+.   Urine pregnancy test obtained today.   Labs: including qualitative hCG, previous labs including AST, ALT, and fasting Triglycerides were all wnl. No need for further lab monitoring except monthly pregnancy tests.   Patient's iPledge # is [id number].   Total dose: 6,600 mg for a weight based dose of 99.0mg/kg    Follow-up in 1 month, earlier for new or changing lesions.       Staff Involved:  Scribe/Staff    Scribe Disclosure:   Darius MENDOZA, am serving as a scribe to document services personally performed by Suyapa Blake PA-C,  based on data collection and the provider's statements to me.    Provider Disclosure:   The documentation recorded by the scribe accurately reflects the services I personally performed and the decisions made by me.    All risks, benefits and alternatives were discussed with patient.  Patient is in agreement and understands the assessment and plan.  All questions were answered.  Sun Screen Education was given.   Return to Clinic in 1 month or sooner as needed.   Suyapa Blake PA-C   [facility]

## 2020-03-24 ENCOUNTER — TELEPHONE (OUTPATIENT)
Dept: DERMATOLOGY | Facility: CLINIC | Age: 25
End: 2020-03-24

## 2020-03-24 ENCOUNTER — MYC MEDICAL ADVICE (OUTPATIENT)
Dept: ALLERGY | Facility: CLINIC | Age: 25
End: 2020-03-24

## 2020-03-24 NOTE — TELEPHONE ENCOUNTER
Left a voicemail for patient asking to call back to reschedule upcoming dermatology appointment to a telephone visit.    Informed patient that due to the COVID-19 virus as we are minimizing any non-urgent or non-life-threatening appointments.     Clinic number provided to call back to reschedule.

## 2020-03-25 ENCOUNTER — TELEPHONE (OUTPATIENT)
Dept: DERMATOLOGY | Facility: CLINIC | Age: 25
End: 2020-03-25

## 2020-03-25 NOTE — TELEPHONE ENCOUNTER
LVM again notifying patient that due to COVID-19, we are minimizing all non-essential and non-life-threatening appointments and are changing her upcoming dermatology appointment to a telephone visit.     Sellywhere message was sent to patient regarding this, which was read. Asked patient to still call clinic to discuss details about her telephone visit. Clinic number provided.

## 2020-03-29 DIAGNOSIS — L70.0 ACNE VULGARIS: Primary | ICD-10-CM

## 2020-03-29 DIAGNOSIS — Z79.899 ON ISOTRETINOIN THERAPY: ICD-10-CM

## 2020-04-06 ENCOUNTER — VIRTUAL VISIT (OUTPATIENT)
Dept: DERMATOLOGY | Facility: CLINIC | Age: 25
End: 2020-04-06
Payer: COMMERCIAL

## 2020-04-06 DIAGNOSIS — L70.0 ACNE VULGARIS: ICD-10-CM

## 2020-04-06 DIAGNOSIS — Z79.899 ON ISOTRETINOIN THERAPY: ICD-10-CM

## 2020-04-06 LAB — HCG UR QL: NEGATIVE

## 2020-04-06 RX ORDER — ISOTRETINOIN 30 MG/1
60 CAPSULE ORAL
Qty: 60 CAPSULE | Refills: 0 | Status: SHIPPED | OUTPATIENT
Start: 2020-04-06 | End: 2020-07-20

## 2020-04-06 ASSESSMENT — PAIN SCALES - GENERAL: PAINLEVEL: NO PAIN (0)

## 2020-04-06 NOTE — PATIENT INSTRUCTIONS
[facility] Teledermatology Visit    Thank you for allowing us to participate in your care. Your findings, instructions and follow-up plan are as follows:  Acne vulgaris, on Accutane.   -Continue Accutane 60 mg daily.  Follow up in one month.  Continue to avoid donating any blood or sharing with others.   I sent your medication and confirmed you in ipledge. Pick this up by 4/12/20      When should I call my doctor?    If you are worsening or not improving, please, contact us or seek urgent care as noted below.     Who should I call with questions?    [facility]: [phone]     [facility]: [phone]    If this is a medical emergency and you are unable to reach an ER, Call [phone]

## 2020-04-06 NOTE — NURSING NOTE
Dermatology Rooming Note    Yaritza Vuong 's goals for this visit include:   Chief Complaint   Patient presents with     Derm Problem     Yaritza is following up for Accutane, states no change.        Olesya Hinds, MACARENAN

## 2020-04-06 NOTE — PROGRESS NOTES
[facility]       Impression and Recommendations (Patient Counseled on the Following):  1: Acne vulgaris, on Accutane.   -Continue Accutane 60 mg daily.  Follow up in one month.  Continue to avoid donating any blood or sharing with others.   I sent your medication and confirmed you in ipledge. Pick this up by 4/12/20    1. Acne vulgaris, on isotretinoin. Hx of resilience despite increasing doses of spironolactone, oral and topical antibiotics and topical retinoids.     Continue Ortho tri cyclin lo. Take one tablet po daily.     Continue isotretinoin 60mg daily 4/6/20 with food containing fat. Goal dose is 10,005- 14,674 mg (150-220mg/kg) for this 66.7 kg patient. Likely this will be her last month.     Method of contraception includes: abstinence. She is also on oral contraceptive pills.    Discussion of the risks and side effects of isotretinoin including but not limited to mucocutaneous dryness, arthralgias, myalgias, depression, suicidal ideation, headache, blurred vision, increase in liver function test and increase in lipids. The iPledge program brochure was provided and the contents discussed with the patient. The patient was counseled that they cannot give blood while on isotretinoin. Advised against tattoos and waxing. No personal or family history of inflammatory bowel disease or hypertriglyceridemia known to patient. Reviewed need to avoid alcohol on medication. The iPledge program consent was obtained. Patient counseled that if they wear contacts, the eyes may become too dry to tolerate. Recommend follow up with eye doctor if this occurs.      Discussed need for sun protection, at least SPF 30+.     Urine pregnancy test obtained today, negative.     Labs: including qualitative hCG, previous labs including AST, ALT, and fasting Triglycerides were all wnl. No need for further lab monitoring except monthly pregnancy tests.     Patient's iPledge # is [id number].     Total dose: 8,400 mg for  a weight based dose of 126.0 mg/kg (one more month of 60 mg will be 10,200 mg, 153 mg/kg)        Follow-up:   1 month or sooner as needed.     Staff only:    All risk, benefits and alternatives were discussed with patient.  Patient is in agreement and understands the assessment and plan.  All questions were answered.  Sun Screen Education was given.   Return to Clinic in 1 month or sooner as needed.   Suyapa Blake PA-C     _____________________________________________________________________________    Dermatology Problem List:   Acne vulgaris   - initiated Accutane 40 mg daily 10/28/19, increased to 60 mg daily 11/25/19, current 60 mg daily   - s/p minocycline 50 mg, tretinoin 0.025% cream, clindamycin 1% gel, spironolactone 150 mg    Encounter Date: Apr 6, 2020    CC:   Chief Complaint   Patient presents with     Derm Problem     Yaritza is following up for Accutane, states no change.        History of Present Illness:  I have reviewed the teledermatology  information and the nursing intake corresponding to this issue. Yaritza Vuong  is a 24 year old female who presents via teledermatology for acne. She was last seen in the dermatology department 3/2/20 when she was continued on isotretinoin 60 mg daily. She has completed 5 months. She has not has not had any breakouts this month. She only notices the red marks on her skin from previous acne. She wonders if there is anything she can do for this.       ROS: Patient is generally feeling well today. Denies headaches, visual changes, epistaxis, confusion, fever, arthralgias, myalgias, abdominal pain, stool changes, hematochezia, mood changes, depression or suicidal ideations.  Labs: Urine pregnancy, negative.     Past Medical History:   Patient Active Problem List   Diagnosis     NO ACTIVE PROBLEMS     No past medical history on file.  No past surgical history on file.    Social History:  Non smoker.     Family History:  Family History   Problem Relation Age  of Onset     Skin Cancer No family hx of      Melanoma No family hx of        Medications:  Current Outpatient Medications   Medication     ISOtretinoin (ABSORICA) 30 MG capsule     norgestim-eth estrad triphasic (ORTHO TRI-CYCLEN LO) 0.18/0.215/0.25 MG-25 MCG tablet     No current facility-administered medications for this visit.           No Known Allergies      _____________________________________________________________________________    Teledermatology information:  - Location of patient: Home  - Patient presented as: return  - Location of teledermatologist:  ([facility] DERMATOLOGY )  - Reason teledermatology is appropriate:  of National Emergency Regarding Coronavirus disease (COVID 19) Outbreak  - Method of transmission:  telephone  - Service start time: 8:47 am  - Service end time: 8:57 am  - Date of report: 04/06/20

## 2020-05-04 ENCOUNTER — VIRTUAL VISIT (OUTPATIENT)
Dept: DERMATOLOGY | Facility: CLINIC | Age: 25
End: 2020-05-04
Payer: COMMERCIAL

## 2020-05-04 DIAGNOSIS — L70.0 ACNE VULGARIS: Primary | ICD-10-CM

## 2020-05-04 RX ORDER — TRETINOIN 0.5 MG/G
CREAM TOPICAL AT BEDTIME
Qty: 45 G | Refills: 3 | Status: SHIPPED | OUTPATIENT
Start: 2020-05-04 | End: 2020-11-07

## 2020-05-04 NOTE — PROGRESS NOTES
"[facility]  Store and Forward and Video ( Invitation sent by:  Amber and send to e-mail at: [email or web address] )      Impression and Recommendations (Patient Counseled on the Following):  1.   -{plan:064081}    2. ***  -{plan:972544}      Follow-up:   {follow-up:222864}     {[facility]914600[facility]\"Staff only\"}:    ***    _____________________________________________________________________________    Dermatology Problem List:  ***    Encounter Date: May 4, 2020    CC:   Chief Complaint   Patient presents with     Derm Problem     Accutane follow up, Yaritza notes less breakouts but still redness and scarring.        History of Present Illness:  I have reviewed the teledermatology information and the nursing intake corresponding to this issue. Yaritza Vuong  is a 24 year old female who presents via teledermatology for ***.      ROS: Patient is generally feeling well today ***    Physical Examination:  General: Well-appearing***, appropriately-developed individual.  Skin: Focused examination within the teledermatology photograph(s) including *** was performed.   -***    Labs:  ***    Past Medical History:   Patient Active Problem List   Diagnosis     NO ACTIVE PROBLEMS     No past medical history on file.  No past surgical history on file.    Social History:  Patient reports that she has never smoked. She has never used smokeless tobacco. She reports that she does not drink alcohol or use drugs.    Family History:  Family History   Problem Relation Age of Onset     Skin Cancer No family hx of      Melanoma No family hx of        Medications:  Current Outpatient Medications   Medication     ISOtretinoin (ABSORICA) 30 MG capsule     norgestim-eth estrad triphasic (ORTHO TRI-CYCLEN LO) 0.18/0.215/0.25 MG-25 MCG tablet     No current facility-administered medications for this visit.           No Known " "Allergies      _____________________________________________________________________________    Teledermatology information:  - Location of patient: {video visit patient location:369737::\"Home\"}  - Patient presented as: {self referral other:427600::\"return\"}  - Location of teledermatologist:  ([facility] DERMATOLOGY )  - Reason teledermatology is appropriate:  {umdermtelereason:372086}  - Image quality and interpretability: {imagequality:372579}  - Physician has received verbal consent for a Video/Photos Visit from the patient? {YES-NO  Default Yes:4444::\"Yes\"}  - In-person dermatology visit recommendation: {visit needed:962194}  - Date of images: ***  - Service start time:***  - Service end time:***  - Date of report: 5/4/2020     "

## 2020-05-04 NOTE — PATIENT INSTRUCTIONS
[facility] Teledermatology Visit    Thank you for allowing us to participate in your care. Your findings, instructions and follow-up plan are as follows:    Acne vulgaris, on Accutane. Completed 6 months of isotretinoin.   Follow up in 4 months.  Continue to avoid donating any blood or sharing with others.   -Start tretinoin 0.05% cream to face every other night. Instructed to apply topical acne medication once every other day and increase to nightly as tolerate.  Waiting 20-30 minutes after washing affected area(s) will decrease irritation. Method of application, side effects and expected results were discussed. The patient will apply pea size amount to the entire face, avoid areas around the eyes, corners of nose and mouth. Discussed side effects including photosensitivity and irritation.  Appropriate handout provided    Topical Retinoids    What are topical retinoids?    These are medicines that are related to Vitamin A. They are used on the skin.    Retin-A , Renova , Differin , and Tazorac  are brand names.    Come in creams and clear gels    Used to treat skin conditions like pimples (acne), face wrinkling, or dark-colored sunspots    How do I use these medicines?    Wash face and let dry for 15 to 30 minutes.    Use a large pea-size amount of medicine to cover the whole face. Do not put on close to the eyes and lips.  Rub in gently.     Start by using every other day.  If you have no irritation after a few days, start to use it daily.      You might have too much irritation with daily use. Use it less often until the irritation goes away.  Then try to increase slowly to daily use.     Irritation improves over time.    You may use moisturizer if your skin becomes dry. Look for  non-comedogenic  (non-pore plugging) and oil free products.      What are the side effects?    Dryness     Peeling and flaking     Irritation of the skin     Possible increased chance of sunburns.  Protect your skin  from sunlight. Wear a hat and use a sunscreen with SPF 30 or higher. Your sunscreen should have both UVA and UVB (broad-spectrum) protection.        When should I call my doctor?    If you are worsening or not improving, please, contact us or seek urgent care as noted below.     Who should I call with questions (adults)?    [facility] (adult and pediatric): [phone]     [facility] (adult): [phone]    For urgent needs outside of business hours call the [facility] at [phone] and ask for the dermatology resident on call    If this is a medical emergency and you are unable to reach an ER, Call 911      Who should I call with questions (pediatric)?  [facility] Pediatric Dermatology  Dr. Sulema Emanuel, Dr. Jennifer Cortés, Dr. Sana Bassett, Suyapa Blake, PA  Dr. Obdluia Serrato, Dr. Hazel Chiu & Dr. Tim Ann  Non Urgent  Nurse Triage Line; [phone]- Ashley and Trish REYNOLDS Care Coordinators   Lynn (/Complex ) [phone]    If you need a prescription refill, please contact your pharmacy. Refills are approved or denied by our Physicians during normal business hours, Monday through Fridays  Per office policy, refills will not be granted if you have not been seen within the past year (or sooner depending on your child's condition)    Scheduling Information:  Pediatric Appointment Scheduling and Call Center [phone]  Radiology Scheduling- [phone]  Sedation Unit Scheduling- [phone]  [place] Scheduling- General [phone]; Pediatric Dermatology [phone]  Main  Services: [phone]  Bengali: [phone]  Belgian: [phone]  Hmong/Stateless/Icelandic: [phone]  Preadmission Nursing Department Fax Number: [phone] (Fax all pre-operative paperwork to this number)    For urgent matters arising during evenings, weekends, or holidays that  cannot wait for normal business hours please call [phone] and ask for the Dermatology Resident On-Call to be paged.

## 2020-05-04 NOTE — PROGRESS NOTES
[facility]  Store and Forward and Video ( Invitation sent by:  Amber and send to e-mail at: [email or web address] )    Impression and Recommendations (Patient Counseled on the Following):  1: Acne vulgaris, on Accutane. Completed 6 months of isotretinoin.   Follow up in 4 months.  Continue to avoid donating any blood or sharing with others.   -Start tretinoin 0.05% cream to face every other night. Instructed to apply topical acne medication once every other day and increase to nightly as tolerate.  Waiting 20-30 minutes after washing affected area(s) will decrease irritation. Method of application, side effects and expected results were discussed. The patient will apply pea size amount to the entire face, avoid areas around the eyes, corners of nose and mouth. Discussed side effects including photosensitivity and irritation.  Appropriate handout provided.      Continue Ortho tri cyclin lo. Take one tablet po daily.     Completed isotretinoin therapy x 6 months.  Goal dose is 10,005- 14,674 mg (150-220mg/kg) for this 66.7 kg patient.     Method of contraception includes: abstinence. She is also on oral contraceptive pills.    Discussed need for sun protection, at least SPF 30+.     Patient's iPledge # is [id number].     Total dose: 10,200 mg for a weight based dose of 153.0 mg/kg.    For the post inflammatory hyperpigmentation, use the tretionoin at bedtime and sunscreen during the day. We could consider PDL if not happy with improvement in the fall.         Follow-up:   4 months or sooner as needed.     Staff only:    All risk, benefits and alternatives were discussed with patient.  Patient is in agreement and understands the assessment and plan.  All questions were answered.  Sun Screen Education was given.   Return to Clinic in 4 months or sooner as needed.   Suyapa Blake PA-C     _____________________________________________________________________________    Dermatology Problem List:    Acne vulgaris   - initiated Accutane 40 mg daily 10/28/19, increased to 60 mg daily 11/25/19, current 60 mg daily   - s/p minocycline 50 mg, tretinoin 0.025% cream, clindamycin 1% gel, spironolactone 150 mg    Encounter Date: May 4, 2020    CC:   Chief Complaint   Patient presents with     Derm Problem     Accutane follow up, Yaritza notes less breakouts but still redness and scarring.        History of Present Illness:  I have reviewed the teledermatology  information and the nursing intake corresponding to this issue. Yaritza Vuong  is a 24 year old female who presents via teledermatology for acne. She was last seen on a virtual visit 1 month ago when she was continued on isotretinoin 60 mg daily. She has completed 6 months. She has not has not had any breakouts this month. She only notices the red marks on her skin from previous acne. She is happy with the improvement in her skin. She would like to be done with the Accutane treatment, if possible. She continues to practice abstinence as she is in isolation with the COVID pandemic.       ROS: Patient is generally feeling well today. Denies headaches, visual changes, epistaxis, confusion, fever, arthralgias, myalgias, abdominal pain, stool changes, hematochezia, mood changes, depression or suicidal ideations.    Physical Examination:  General: Well-appearing 24 year old female, appropriately-developed individual.  Skin: Focused examination within the teledermatology photograph(s) including face was performed.   - There are a few hyperemic pink macules to bilateral cheeks, greater on the left         Past Medical History:   Patient Active Problem List   Diagnosis     NO ACTIVE PROBLEMS     No past medical history on file.  No past surgical history on file.    Social History:  Non smoker.     Family History:  Family History   Problem Relation Age of Onset     Skin Cancer No family hx of      Melanoma No family hx of        Medications:  Current Outpatient  Medications   Medication     ISOtretinoin (ABSORICA) 30 MG capsule     norgestim-eth estrad triphasic (ORTHO TRI-CYCLEN LO) 0.18/0.215/0.25 MG-25 MCG tablet     No current facility-administered medications for this visit.           No Known Allergies      _____________________________________________________________________________    Teledermatology information:  - Location of patient: Home  - Patient presented as: return  - Location of teledermatologist:  ([facility] DERMATOLOGY )  - Reason teledermatology is appropriate:  of National Emergency Regarding Coronavirus disease (COVID 19) Outbreak  - Method of transmission:  telephone/ [place]   - Image quality and interpretability: acceptable  - Physician has received verbal consent for a Video/Photos Visit from the patient? Yes  - In-person dermatology visit recommendation: no  - Date of images: 5/4/20  - Service start time: 8:14 am  - Service end time: 8:26 am  - Date of report: 05/04/20

## 2020-05-04 NOTE — NURSING NOTE
Dermatology Rooming Note    Yaritza Vuong 's goals for this visit include:   Chief Complaint   Patient presents with     Derm Problem     Accutane follow up, Yaritza notes less breakouts but still redness and scarring.      Trina Alvarenga LPN

## 2020-05-19 DIAGNOSIS — L70.0 ACNE VULGARIS: ICD-10-CM

## 2020-05-21 RX ORDER — NORGESTIMATE AND ETHINYL ESTRADIOL 7DAYSX3 LO
1 KIT ORAL DAILY
Qty: 28 TABLET | Refills: 2 | Status: SHIPPED | OUTPATIENT
Start: 2020-05-21 | End: 2020-07-16

## 2020-05-21 NOTE — TELEPHONE ENCOUNTER
ORTHO TRI-CYCLEN LO      Last Written Prescription Date:  11/25/19  Last Fill Quantity: 28,   # refills: 5  Last Office Visit : 5/4/20  Future Office visit:  NONE  4 MOS  Scheduling has been notified to contact the pt for appointment.  Routing refill request to provider for review/approval because:  Drug not on the refill protocol

## 2020-05-21 NOTE — TELEPHONE ENCOUNTER
----- Message from Queta Abdi RN sent at 5/21/2020 10:42 AM CDT -----  Regarding: DERM  APPT  Please call to schedule  WITH Suyapa Blake PA-C      Due for RTC  SEPT 2020    I do not need any follow up on the scheduling of this appointment unless the patient will no longer be receiving care in our clinic.  THANKS  MRT

## 2020-05-21 NOTE — TELEPHONE ENCOUNTER
Received refill request for ortho tri cyclen as the resident on call. Reviewed patient's chart and attached communication. Patient last seen 5/4/20 for acne vulgaris. RTC 4 months. After reviewing the medication list and assessment and plan from last visit, the refill request was accepted.    Routing to Suyapa Blake PA-C as KISHAN Rhodes MD  Dermatology Resident, PGY4

## 2020-05-22 ENCOUNTER — TELEPHONE (OUTPATIENT)
Dept: DERMATOLOGY | Facility: CLINIC | Age: 25
End: 2020-05-22

## 2020-07-14 ENCOUNTER — MYC REFILL (OUTPATIENT)
Dept: DERMATOLOGY | Facility: CLINIC | Age: 25
End: 2020-07-14

## 2020-07-14 DIAGNOSIS — L70.0 ACNE VULGARIS: ICD-10-CM

## 2020-07-14 RX ORDER — NORGESTIMATE AND ETHINYL ESTRADIOL 7DAYSX3 LO
1 KIT ORAL DAILY
Qty: 28 TABLET | Refills: 2 | Status: CANCELLED | OUTPATIENT
Start: 2020-07-14

## 2020-07-16 ENCOUNTER — MYC REFILL (OUTPATIENT)
Dept: DERMATOLOGY | Facility: CLINIC | Age: 25
End: 2020-07-16

## 2020-07-16 DIAGNOSIS — L70.0 ACNE VULGARIS: ICD-10-CM

## 2020-07-16 RX ORDER — NORGESTIMATE AND ETHINYL ESTRADIOL 7DAYSX3 LO
1 KIT ORAL DAILY
Qty: 28 TABLET | Refills: 1 | Status: SHIPPED | OUTPATIENT
Start: 2020-07-16 | End: 2020-07-20

## 2020-07-16 NOTE — TELEPHONE ENCOUNTER
"Last Clinic Visit: 5/4/20, [place] 7/17/20  Per patient comment:  Patient comment: Hi, I contacted my pharmacy to renew this prescription (as there are supposed to be 2 refills remaining), but they changed its status to \"inactive\" after I didn't pick it up last month. They cannot reactivate and need a new prescription to be able to fill it. Would that be possible? Thank you!     Prescription being sent is a replacement from the last prescription, nothing is being added to the refills  "

## 2020-07-20 ENCOUNTER — VIRTUAL VISIT (OUTPATIENT)
Dept: DERMATOLOGY | Facility: CLINIC | Age: 25
End: 2020-07-20
Payer: COMMERCIAL

## 2020-07-20 DIAGNOSIS — L70.0 ACNE VULGARIS: ICD-10-CM

## 2020-07-20 RX ORDER — NORGESTIMATE AND ETHINYL ESTRADIOL 7DAYSX3 LO
1 KIT ORAL DAILY
Qty: 28 TABLET | Refills: 6 | Status: SHIPPED | OUTPATIENT
Start: 2020-07-20 | End: 2021-01-20

## 2020-07-20 ASSESSMENT — PAIN SCALES - GENERAL: PAINLEVEL: NO PAIN (0)

## 2020-07-20 NOTE — NURSING NOTE
Dermatology Rooming Note    Yaritza Vuong 's goals for this visit include:   Chief Complaint   Patient presents with     Acne     Yaritza is following up on acne.      COREEN Beltre

## 2020-07-20 NOTE — LETTER
7/20/2020       RE: Yaritza Vuong   [address] [place] [address]     Dear Colleague,    Thank you for referring your patient, Yaritza Vuong , to the [facility] DERMATOLOGY at [facility]. Please see a copy of my visit note below.    [facility]  Video: ( Invitation sent by:  Amber and send to e-mail at: [email or web address] )      Impression and Recommendations (Patient Counseled on the Following):  1. Acne Vulgaris, hormonal component - s/p isotretinoin. Well managed with topical tretinoin and OCPs. Rarely sees acne flares. However this OCP has caused some increased premenstrual sx for her including increased cramping, irritability and bloating prior to her periods. Would like to discuss alternative OCP or ways to better manage these sx. She does not want to discontinue her OCPs because they are working so well for her skin.  -Continue tretinoin 0.05% cream nightly  -Continue with daily sun protection, at least SPF 30+  -Continue ortho tri-cyclen lo 0.18/0.215/0.25mg tablets daily  -Discussed taking birthcontrol pills continuously to establish more steady state estrogen as this will likely help prevent acne flares as well as help to better manage her PMS sx prior to getting her period. She would like to try this for a few months and see if it helps to eliminate irritability, bloating and severe menstrual cramps prior to switching to another oral contraceptive.      Follow-up:   Follow-up with dermatology in approximately 6mo. Earlier for new or changing lesions or rash.      Staff only    All risks, benefits and alternatives were discussed with patient.  Patient is in agreement and understands the assessment and plan.  All questions were answered.    Skye Wade PA-C, MPAS  [facility] [place]: Phone: [phone], Fax: [phone]  [facility]: Phone:  [phone],  Fax: [phone]  _____________________________________________________________________________    Dermatology Problem List:   1. Acne vulgaris   - current tx: tretinoin 0.05% cream, OCPs   - s/p minocycline 50 mg, tretinoin 0.025% cream, clindamycin 1% gel, spironolactone 150 mg, isotretinoin completed 5/4/20, total cumulative dose 10,200mg (153mg/kg)    Encounter Date: Jul 20, 2020    CC:   Chief Complaint   Patient presents with     Acne     Yaritza is following up on acne.        History of Present Illness:  I have reviewed the teledermatology information and the nursing intake corresponding to this issue. Yaritza Vuong  is a 24 year old female who presents via teledermatology for an acne follow-up. She was last seen by Suyapa Urbano PA-C on 5/4/20 when her isotretinoin was discontinued. She since has continued on tretinoin 0.05% cream as well as her oral contraceptives. Today she reports her skin is doing great. She no longer gets acne flares, very seldomly will get a new papule prior to her period, but this is very tolerable for her. She would like to discuss her OCP. She notes that she does not want to come off the birth control pills as they have been tremendously helpful for her skin, however she wonders if maybe she should switch to a different one. She notes that since starting the OCPs she has been getting more severe PMS sx than she had previously. She specifically notes an increased severity in menstrual cramps, which occasionally leads to vomitting as well as more irritability and bloating just prior to her period. She otherwise is doing great and has no other concerns today.     ROS: Patient is generally feeling well today   -Constitutional: no unintended weight loss/gain, no night sweats or fevers  -Skin: as per HPI  -GI: no nausea, abdominal pain, vomiting, diarrhea or blood in the stool      Physical Examination:  General: Well-appearing, appropriately-developed  individual.  Skin: Focused examination within the teledermatology photograph(s) including face was performed.   - There are a few hyperemic pink macules to bilateral cheeks, greater on the left       Past Medical History:   Patient Active Problem List   Diagnosis     NO ACTIVE PROBLEMS     No past medical history on file.  No past surgical history on file.    Social History:  Patient reports that she has never smoked. She has never used smokeless tobacco. She reports that she does not drink alcohol or use drugs.    Family History:  Family History   Problem Relation Age of Onset     Skin Cancer No family hx of      Melanoma No family hx of        Medications:  Current Outpatient Medications   Medication     norgestim-eth estrad triphasic (ORTHO TRI-CYCLEN LO) 0.18/0.215/0.25 MG-25 MCG tablet     tretinoin (RETIN-A) 0.05 % external cream     ISOtretinoin (ABSORICA) 30 MG capsule     No current facility-administered medications for this visit.           No Known Allergies      _____________________________________________________________________________    Teledermatology information:  - Location of patient: [place]  - Patient presented as: return  - Location of teledermatologist:  ([facility] DERMATOLOGY )  - Reason teledermatology is appropriate:  of National Emergency Regarding Coronavirus disease (COVID 19) Outbreak  - Image quality and interpretability: acceptable  - Physician has received verbal consent for a Video/Photos Visit from the patient? Yes  - In-person dermatology visit recommendation: no  - Date of images: 7/20/20  - Length of call: 10min  - Date of report: 7/20/2020     Again, thank you for allowing me to participate in the care of your patient.      Sincerely,    Skye Wade PA-C

## 2020-07-20 NOTE — PATIENT INSTRUCTIONS
[facility] Teledermatology Visit    Thank you for allowing us to participate in your care. Your findings, instructions and follow-up plan are as follows:    1. Acne Vulgaris, hormonal component - recently completed isotretinoin. Well managed with topical tretinoin and birth control pills. Rarely sees acne flares. However this birth control pill has caused some increased premenstrual symptoms for her including increased cramping, irritability and bloating prior to her periods.   -Continue tretinoin 0.05% cream nightly  -Continue with daily sun protection, at least SPF 30+  -Continue ortho tri-cyclen lo 0.18/0.215/0.25mg tablets daily  -Discussed taking birthcontrol pills continuously (skipping the period week) to establish more steady state estrogen as this will likely help prevent any acne flares as well as help to better manage her PMS symptoms prior to getting her period. She would like to try this for a few months and see if it helps to eliminate irritability, bloating and severe menstrual cramps prior to switching to another oral contraceptive.       When should I call my doctor?    If you are worsening or not improving, please, contact us or seek urgent care as noted below.     Who should I call with questions (adults)?    [facility] (adult and pediatric): [phone]     [facility] (adult): [phone]    For urgent needs outside of business hours call the [facility] at [phone] and ask for the dermatology resident on call    If this is a medical emergency and you are unable to reach an ER, Call [phone]      Who should I call with questions (pediatric)?  [facility]- Pediatric Dermatology  Dr. Sulema Emanuel, Dr. Jennifer Cortés, Dr. Sana Bassett, Suyapajessica Blake, PA  Dr. Obdulia Serrato, Dr. Hazel Chiu & Dr. Tim Ann  Non Urgent  Nurse Triage Line; [phone]- Ashley and Trish REYNOLDS Care  Coordinators   Lynn (/Complex ) [phone]    If you need a prescription refill, please contact your pharmacy. Refills are approved or denied by our Physicians during normal business hours, Monday through Fridays  Per office policy, refills will not be granted if you have not been seen within the past year (or sooner depending on your child's condition)    Scheduling Information:  Pediatric Appointment Scheduling and Call Center [phone]  Radiology Scheduling- [phone]  Sedation Unit Scheduling- [phone]  [place] Scheduling- [facility] [phone]; Pediatric Dermatology [phone]  Main  Services: [phone]  Ecuadorean: [phone]  Egyptian: [phone]  Hmong/Sami/Botswanan: [phone]  Preadmission Nursing Department Fax Number: [phone] (Fax all pre-operative paperwork to this number)    For urgent matters arising during evenings, weekends, or holidays that cannot wait for normal business hours please call [phone] and ask for the Dermatology Resident On-Call to be paged.

## 2020-07-20 NOTE — PROGRESS NOTES
[facility]  Video: ( Invitation sent by:  Amber and send to e-mail at: [email or web address] )      Impression and Recommendations (Patient Counseled on the Following):  1. Acne Vulgaris, hormonal component - s/p isotretinoin. Well managed with topical tretinoin and OCPs. Rarely sees acne flares. However this OCP has caused some increased premenstrual sx for her including increased cramping, irritability and bloating prior to her periods. Would like to discuss alternative OCP or ways to better manage these sx. She does not want to discontinue her OCPs because they are working so well for her skin.  -Continue tretinoin 0.05% cream nightly  -Continue with daily sun protection, at least SPF 30+  -Continue ortho tri-cyclen lo 0.18/0.215/0.25mg tablets daily  -Discussed taking birthcontrol pills continuously to establish more steady state estrogen as this will likely help prevent acne flares as well as help to better manage her PMS sx prior to getting her period. She would like to try this for a few months and see if it helps to eliminate irritability, bloating and severe menstrual cramps prior to switching to another oral contraceptive.      Follow-up:   Follow-up with dermatology in approximately 6mo. Earlier for new or changing lesions or rash.      Staff only    All risks, benefits and alternatives were discussed with patient.  Patient is in agreement and understands the assessment and plan.  All questions were answered.    Skye Wade PA-C, MPAS  [facility] [place]: Phone: [phone], Fax: [phone]  [facility]: Phone: [phone],  Fax: [phone]  _____________________________________________________________________________    Dermatology Problem List:   1. Acne vulgaris   - current tx: tretinoin 0.05% cream, OCPs   - s/p minocycline 50 mg, tretinoin 0.025% cream, clindamycin 1% gel, spironolactone 150  mg, isotretinoin completed 5/4/20, total cumulative dose 10,200mg (153mg/kg)    Encounter Date: Jul 20, 2020    CC:   Chief Complaint   Patient presents with     Acne     Yaritza is following up on acne.        History of Present Illness:  I have reviewed the teledermatology information and the nursing intake corresponding to this issue. Yaritza Vuong  is a 24 year old female who presents via teledermatology for an acne follow-up. She was last seen by Suyapa Urbano PA-C on 5/4/20 when her isotretinoin was discontinued. She since has continued on tretinoin 0.05% cream as well as her oral contraceptives. Today she reports her skin is doing great. She no longer gets acne flares, very seldomly will get a new papule prior to her period, but this is very tolerable for her. She would like to discuss her OCP. She notes that she does not want to come off the birth control pills as they have been tremendously helpful for her skin, however she wonders if maybe she should switch to a different one. She notes that since starting the OCPs she has been getting more severe PMS sx than she had previously. She specifically notes an increased severity in menstrual cramps, which occasionally leads to vomitting as well as more irritability and bloating just prior to her period. She otherwise is doing great and has no other concerns today.     ROS: Patient is generally feeling well today   -Constitutional: no unintended weight loss/gain, no night sweats or fevers  -Skin: as per HPI  -GI: no nausea, abdominal pain, vomiting, diarrhea or blood in the stool      Physical Examination:  General: Well-appearing, appropriately-developed individual.  Skin: Focused examination within the teledermatology photograph(s) including face was performed.   - There are a few hyperemic pink macules to bilateral cheeks, greater on the left       Past Medical History:   Patient Active Problem List   Diagnosis     NO ACTIVE PROBLEMS     No past  medical history on file.  No past surgical history on file.    Social History:  Patient reports that she has never smoked. She has never used smokeless tobacco. She reports that she does not drink alcohol or use drugs.    Family History:  Family History   Problem Relation Age of Onset     Skin Cancer No family hx of      Melanoma No family hx of        Medications:  Current Outpatient Medications   Medication     norgestim-eth estrad triphasic (ORTHO TRI-CYCLEN LO) 0.18/0.215/0.25 MG-25 MCG tablet     tretinoin (RETIN-A) 0.05 % external cream     ISOtretinoin (ABSORICA) 30 MG capsule     No current facility-administered medications for this visit.           No Known Allergies      _____________________________________________________________________________    Teledermatology information:  - Location of patient: [place]  - Patient presented as: return  - Location of teledermatologist:  ([facility] DERMAT[facility] )  - Reason teledermatology is appropriate:  of National Emergency Regarding Coronavirus disease (COVID 19) Outbreak  - Image quality and interpretability: acceptable  - Physician has received verbal consent for a Video/Photos Visit from the patient? Yes  - In-person dermatology visit recommendation: no  - Date of images: 7/20/20  - Length of call: 10min  - Date of report: 7/20/2020

## 2020-11-07 ENCOUNTER — MYC REFILL (OUTPATIENT)
Dept: DERMATOLOGY | Facility: CLINIC | Age: 25
End: 2020-11-07

## 2020-11-07 DIAGNOSIS — L70.0 ACNE VULGARIS: ICD-10-CM

## 2020-11-09 RX ORDER — NORGESTIMATE AND ETHINYL ESTRADIOL 7DAYSX3 LO
1 KIT ORAL DAILY
Qty: 28 TABLET | Refills: 6 | Status: CANCELLED | OUTPATIENT
Start: 2020-11-09

## 2020-11-09 RX ORDER — TRETINOIN 0.5 MG/G
CREAM TOPICAL AT BEDTIME
Qty: 45 G | Refills: 5 | Status: SHIPPED | OUTPATIENT
Start: 2020-11-09

## 2021-01-15 ENCOUNTER — HEALTH MAINTENANCE LETTER (OUTPATIENT)
Age: 26
End: 2021-01-15

## 2021-01-16 DIAGNOSIS — L70.0 ACNE VULGARIS: ICD-10-CM

## 2021-01-20 RX ORDER — NORGESTIMATE AND ETHINYL ESTRADIOL 7DAYSX3 LO
1 KIT ORAL DAILY
Qty: 28 TABLET | Refills: 3 | Status: SHIPPED | OUTPATIENT
Start: 2021-01-20 | End: 2021-04-27

## 2021-01-20 NOTE — TELEPHONE ENCOUNTER
Received refill request for tri-lo-caprice (ortho-tri cyclen lo). Patient chart reviewed. Refill accepted. Rx routed to Skye Adamson MD  Dermatology Resident  [facility]

## 2021-01-20 NOTE — TELEPHONE ENCOUNTER
norgestim-eth estrad triphasic (ORTHO TRI-CYCLEN LO) 0.18/0.215/0.25 MG-25 MCG tablet    Take 1 tablet by mouth daily  Dx:  Acne vulgaris     Last Written Prescription Date:  7/20/20  Last Fill Quantity: 28,   # refills: 6  Last Office Visit : 7/20/20  Future Office visit:  none    Routing refill request to provider for review/approval because:  Drug not on the [facility], [facility] or [facility] refill protocol

## 2021-04-26 DIAGNOSIS — L70.0 ACNE VULGARIS: ICD-10-CM

## 2021-04-27 NOTE — TELEPHONE ENCOUNTER
TRI-LO-JOSE TABLET  Last Written Prescription Date:  1/28/21  Last Fill Quantity: 28,   # refills: 3  Last Office Visit : 7/20/20  Future Office visit:  None    Routing refill request to provider for review/approval because:   Not on Derm refill protocol.  Scheduling has been notified to contact the pt for appointment.

## 2021-04-28 RX ORDER — NORGESTIMATE AND ETHINYL ESTRADIOL 7DAYSX3 LO
1 KIT ORAL DAILY
Qty: 28 TABLET | Refills: 3 | Status: SHIPPED | OUTPATIENT
Start: 2021-04-28 | End: 2021-11-22

## 2021-09-04 ENCOUNTER — HEALTH MAINTENANCE LETTER (OUTPATIENT)
Age: 26
End: 2021-09-04

## 2021-11-22 ENCOUNTER — LAB (OUTPATIENT)
Dept: LAB | Facility: CLINIC | Age: 26
End: 2021-11-22
Payer: COMMERCIAL

## 2021-11-22 ENCOUNTER — OFFICE VISIT (OUTPATIENT)
Dept: DERMATOLOGY | Facility: CLINIC | Age: 26
End: 2021-11-22
Payer: COMMERCIAL

## 2021-11-22 VITALS — HEART RATE: 68 BPM | SYSTOLIC BLOOD PRESSURE: 103 MMHG | DIASTOLIC BLOOD PRESSURE: 63 MMHG

## 2021-11-22 DIAGNOSIS — L70.0 ACNE VULGARIS: ICD-10-CM

## 2021-11-22 DIAGNOSIS — L70.0 ACNE VULGARIS: Primary | ICD-10-CM

## 2021-11-22 DIAGNOSIS — Z51.81 MEDICATION MONITORING ENCOUNTER: ICD-10-CM

## 2021-11-22 LAB
BUN SERPL-MCNC: 18 MG/DL (ref 7–30)
CREAT SERPL-MCNC: 0.72 MG/DL (ref 0.52–1.04)
GFR SERPL CREATININE-BSD FRML MDRD: >90 ML/MIN/1.73M2
POTASSIUM BLD-SCNC: 3.7 MMOL/L (ref 3.4–5.3)
SODIUM SERPL-SCNC: 140 MMOL/L (ref 133–144)

## 2021-11-22 PROCEDURE — 84132 ASSAY OF SERUM POTASSIUM: CPT | Performed by: PATHOLOGY

## 2021-11-22 PROCEDURE — 36415 COLL VENOUS BLD VENIPUNCTURE: CPT | Performed by: PATHOLOGY

## 2021-11-22 PROCEDURE — 84520 ASSAY OF UREA NITROGEN: CPT | Performed by: PATHOLOGY

## 2021-11-22 PROCEDURE — 82565 ASSAY OF CREATININE: CPT | Performed by: PATHOLOGY

## 2021-11-22 PROCEDURE — 99204 OFFICE O/P NEW MOD 45 MIN: CPT | Performed by: PHYSICIAN ASSISTANT

## 2021-11-22 PROCEDURE — 84295 ASSAY OF SERUM SODIUM: CPT | Performed by: PATHOLOGY

## 2021-11-22 RX ORDER — SPIRONOLACTONE 50 MG/1
100 TABLET, FILM COATED ORAL DAILY
Qty: 60 TABLET | Refills: 2 | Status: SHIPPED | OUTPATIENT
Start: 2021-11-22 | End: 2022-03-01

## 2021-11-22 RX ORDER — DAPSONE 50 MG/G
GEL TOPICAL DAILY
Qty: 60 G | Refills: 3 | Status: SHIPPED | OUTPATIENT
Start: 2021-11-22

## 2021-11-22 ASSESSMENT — PAIN SCALES - GENERAL: PAINLEVEL: NO PAIN (0)

## 2021-11-22 NOTE — PROGRESS NOTES
[facility] Dermatology Note  Encounter Date: Nov 22, 2021  Office Visit     Dermatology Problem List:   1. Acne vulgaris   - current tx: tretinoin 0.05% cream, , spironolactone 100 mg, dapsone 5% gel  - s/p minocycline 50 mg, tretinoin 0.025% cream, clindamycin 1% gel, isotretinoin completed 5/4/20, total cumulative dose 10,200mg (153mg/kg), OCPs     ____________________________________________    Assessment & Plan:    1. Acne Vulgaris, hormonal component - s/p isotretinoin. Flaring today  - Continue tretinoin 0.05% cream every other day  - Start spironolactone 100 mg daily, reviewed side effects (Pt has used this before)  - Start dapsone 5% gel daily in AM  - Labs today: Creatinine, Sodium, Potassium, Urea nitrogen  - BP today    Procedures Performed:   None    Follow-up: 3 month(s) in-person, or earlier for new or changing lesions    Staff and Scribe:     Scribe Disclosure:  I, Jama Newby, am serving as a scribe to document services personally performed by Suyapa Blake PA-C based on data collection and the provider's statements to me.     Provider Disclosure:   The documentation recorded by the scribe accurately reflects the services I personally performed and the decisions made by me.    All risks, benefits and alternatives were discussed with patient.  Patient is in agreement and understands the assessment and plan.  All questions were answered.  Sun Screen Education was given.   Return to Clinic in 3 months or sooner as needed.   Suyapa Blake PA-C   [facility]   ____________________________________________    CC: Acne (pt states she is here for a follow up on acne. )    HPI:  Ms. Yaritza Vuong  is a(n) 26 year old female who presents today as a return patient for follow-up. Last seen by Skye Wade PA-C, virtually on 7/20/2020, at which time patient was continued continued on current treatments for acne vulgaris.    Today,  patient reports that her acne has worsened since stopping isotretinoin. She has been using tretinoin cream. She reports that she started using a copper IUD for birth control approximately 5 months ago.    Patient is otherwise feeling well, without additional skin concerns.    Labs Reviewed:  Last Comprehensive Metabolic Panel:  Sodium   Date Value Ref Range Status   11/22/2021 140 133 - 144 mmol/L Final   12/30/2019 137 133 - 144 mmol/L Final     Potassium   Date Value Ref Range Status   11/22/2021 3.7 3.4 - 5.3 mmol/L Final   12/30/2019 4.5 3.4 - 5.3 mmol/L Final     Chloride   Date Value Ref Range Status   12/30/2019 106 94 - 109 mmol/L Final     Carbon Dioxide   Date Value Ref Range Status   12/30/2019 28 20 - 32 mmol/L Final     Anion Gap   Date Value Ref Range Status   12/30/2019 3 3 - 14 mmol/L Final     Glucose   Date Value Ref Range Status   12/30/2019 88 70 - 99 mg/dL Final     Urea Nitrogen   Date Value Ref Range Status   11/22/2021 18 7 - 30 mg/dL Final   12/30/2019 8 7 - 30 mg/dL Final     Creatinine   Date Value Ref Range Status   11/22/2021 0.72 0.52 - 1.04 mg/dL Final   12/30/2019 0.67 0.52 - 1.04 mg/dL Final     GFR Estimate   Date Value Ref Range Status   11/22/2021 >90 >60 mL/min/1.73m2 Final     Comment:     As of July 11, 2021, eGFR is calculated by the CKD-EPI creatinine equation, without race adjustment. eGFR can be influenced by muscle mass, exercise, and diet. The reported eGFR is an estimation only and is only applicable if the renal function is stable.   12/30/2019 >90 >60 mL/min/[1.73_m2] Final     Comment:     Non  GFR Calc  Starting 12/18/2018, serum creatinine based estimated GFR (eGFR) will be   calculated using the Chronic Kidney Disease Epidemiology Collaboration   (CKD-EPI) equation.       Calcium   Date Value Ref Range Status   12/30/2019 9.2 8.5 - 10.1 mg/dL Final       Physical Exam:  Vitals: /63 (BP Location: Right arm, Patient Position: Sitting)   Pulse  68   SKIN: Focused examination of face was performed.  - Several pink papules noted periorally. Some superficial scarring noted.  - No other lesions of concern on areas examined.     Medications:  Current Outpatient Medications   Medication     tretinoin (RETIN-A) 0.05 % external cream     norgestim-eth estrad triphasic (TRI-LO-JOSE) 0.18/0.215/0.25 MG-25 MCG tablet     No current facility-administered medications for this visit.      Past Medical History:   Patient Active Problem List   Diagnosis     NO ACTIVE PROBLEMS     No past medical history on file.     CC Referred Self, MD  No address on file on close of this encounter.

## 2021-11-22 NOTE — LETTER
11/22/2021       RE: Yaritza Roach  [id number] [facility] [id number]     Dear Colleague,    Thank you for referring your patient, Yaritza Roach, to the [facility] [place] at [facility]. Please see a copy of my visit note below.    [facility] Dermatology Note  Encounter Date: Nov 22, 2021  Office Visit     Dermatology Problem List:   1. Acne vulgaris   - current tx: tretinoin 0.05% cream, , spironolactone 100 mg, dapsone 5% gel  - s/p minocycline 50 mg, tretinoin 0.025% cream, clindamycin 1% gel, isotretinoin completed 5/4/20, total cumulative dose 10,200mg (153mg/kg), OCPs     ____________________________________________    Assessment & Plan:    1. Acne Vulgaris, hormonal component - s/p isotretinoin. Flaring today  - Continue tretinoin 0.05% cream every other day  - Start spironolactone 100 mg daily, reviewed side effects (Pt has used this before)  - Start dapsone 5% gel daily in AM  - Labs today: Creatinine, Sodium, Potassium, Urea nitrogen  - BP today    Procedures Performed:   None    Follow-up: 3 month(s) in-person, or earlier for new or changing lesions    Staff and Scribe:     Scribe Disclosure:  I, Jama Newby, am serving as a scribe to document services personally performed by Suyapa Blake PA-C based on data collection and the provider's statements to me.     Provider Disclosure:   The documentation recorded by the scribe accurately reflects the services I personally performed and the decisions made by me.    All risks, benefits and alternatives were discussed with patient.  Patient is in agreement and understands the assessment and plan.  All questions were answered.  Sun Screen Education was given.   Return to Clinic in 3 months or sooner as needed.   Suyapa Blake PA-C   [facility]   ____________________________________________    CC:  Acne (pt states she is here for a follow up on acne. )    HPI:  Ms. Yaritza Vuong  is a(n) 26 year old female who presents today as a return patient for follow-up. Last seen by Skye Wade PA-C, franky on 7/20/2020, at which time patient was continued continued on current treatments for acne vulgaris.    Today, patient reports that her acne has worsened since stopping isotretinoin. She has been using tretinoin cream. She reports that she started using a copper IUD for birth control approximately 5 months ago.    Patient is otherwise feeling well, without additional skin concerns.    Labs Reviewed:  Last Comprehensive Metabolic Panel:  Sodium   Date Value Ref Range Status   11/22/2021 140 133 - 144 mmol/L Final   12/30/2019 137 133 - 144 mmol/L Final     Potassium   Date Value Ref Range Status   11/22/2021 3.7 3.4 - 5.3 mmol/L Final   12/30/2019 4.5 3.4 - 5.3 mmol/L Final     Chloride   Date Value Ref Range Status   12/30/2019 106 94 - 109 mmol/L Final     Carbon Dioxide   Date Value Ref Range Status   12/30/2019 28 20 - 32 mmol/L Final     Anion Gap   Date Value Ref Range Status   12/30/2019 3 3 - 14 mmol/L Final     Glucose   Date Value Ref Range Status   12/30/2019 88 70 - 99 mg/dL Final     Urea Nitrogen   Date Value Ref Range Status   11/22/2021 18 7 - 30 mg/dL Final   12/30/2019 8 7 - 30 mg/dL Final     Creatinine   Date Value Ref Range Status   11/22/2021 0.72 0.52 - 1.04 mg/dL Final   12/30/2019 0.67 0.52 - 1.04 mg/dL Final     GFR Estimate   Date Value Ref Range Status   11/22/2021 >90 >60 mL/min/1.73m2 Final     Comment:     As of July 11, 2021, eGFR is calculated by the CKD-EPI creatinine equation, without race adjustment. eGFR can be influenced by muscle mass, exercise, and diet. The reported eGFR is an estimation only and is only applicable if the renal function is stable.   12/30/2019 >90 >60 mL/min/[1.73_m2] Final     Comment:     Non  GFR Calc  Starting 12/18/2018, serum  creatinine based estimated GFR (eGFR) will be   calculated using the Chronic Kidney Disease Epidemiology Collaboration   (CKD-EPI) equation.       Calcium   Date Value Ref Range Status   12/30/2019 9.2 8.5 - 10.1 mg/dL Final       Physical Exam:  Vitals: /63 (BP Location: Right arm, Patient Position: Sitting)   Pulse 68   SKIN: Focused examination of face was performed.  - Several pink papules noted periorally. Some superficial scarring noted.  - No other lesions of concern on areas examined.     Medications:  Current Outpatient Medications   Medication     tretinoin (RETIN-A) 0.05 % external cream     norgestim-eth estrad triphasic (TRI-LO-JOSE) 0.18/0.215/0.25 MG-25 MCG tablet     No current facility-administered medications for this visit.      Past Medical History:   Patient Active Problem List   Diagnosis     NO ACTIVE PROBLEMS     No past medical history on file.     CC Referred Self, MD  No address on file on close of this encounter.

## 2022-02-05 NOTE — PROGRESS NOTES
[facility] Dermatology Note      Dermatology Problem List:  1. Acne vulgaris   - initiated Accutane 40 mg daily 10/28/19, increased to 60 mg daily 11/25/19, current 60 mg daily 12/30/19  - s/p minocycline 50 mg, tretinoin 0.025% cream, clindamycin 1% gel, spironolactone 150 mg    Encounter Date: Dec 30, 2019    CC:  Chief Complaint   Patient presents with     Sharmila Vigil is here today for an acctane follow up.          History of Present Illness:  Ms. Yaritza Vuong  is a 24 year old female who presents today for acne follow up. She was last seen in the dermatology clinic on 11/25/19 for acne vulgaris when she increased to Accutane 60 mg daily. Today she is at the end of month #2.    She reports that she has been experiencing lip dryness in the last week and intermittent nose bleeds if she blows her nose a lot. She has continued to have breakouts but notes that they are fewer in number, less severe, and have resolved quicker. Denies acne of the chest or back. She has controlled her facial dryness with frequent use of moisturizer.The patient is fasting today. Ortho Tri Cyclen lo has worked well for her and she is interested in continuing its use.     Denies headaches, visual changes, epistaxis, confusion, fever, arthralgias, myalgias, abdominal pain, stool changes, hematochezia, mood changes, depression or suicidal ideations.      Past Medical History:   Denies family history of clotting disorders.  Admits to a family history of breast cancer in a few of her aunts.   Patient Active Problem List   Diagnosis     NO ACTIVE PROBLEMS     No past medical history on file.  No past surgical history on file.    Social History:  Social History     Socioeconomic History     Marital status: Single     Spouse name: None     Number of children: None     Years of education: None     Highest education level: None   Occupational History     None   Social Needs     Financial resource strain: None      Food insecurity:     Worry: None     Inability: None     Transportation needs:     Medical: None     Non-medical: None   Tobacco Use     Smoking status: Never Smoker     Smokeless tobacco: Never Used   Substance and Sexual Activity     Alcohol use: No     Drug use: No     Sexual activity: No   Lifestyle     Physical activity:     Days per week: None     Minutes per session: None     Stress: None   Relationships     Social connections:     Talks on phone: None     Gets together: None     Attends Christian service: None     Active member of club or organization: None     Attends meetings of clubs or organizations: None     Relationship status: None     Intimate partner violence:     Fear of current or ex partner: None     Emotionally abused: None     Physically abused: None     Forced sexual activity: None   Other Topics Concern     None   Social History Narrative     None       Family History:  No family history on file.    Medications:  Current Outpatient Medications   Medication Sig Dispense Refill     ISOtretinoin (ABSORICA) 30 MG capsule Take 2 capsules (60 mg) by mouth daily with food 60 capsule 0     norgestim-eth estrad triphasic (ORTHO TRI-CYCLEN LO) 0.18/0.215/0.25 MG-25 MCG tablet Take 1 tablet by mouth daily 28 tablet 5       No Known Allergies    Review of Systems:  -Skin Establ Pt: The patient denies any new rash, pruritus, or lesions that are symptomatic, changing or bleeding, except as per HPI.  -Constitutional: The patient is feeling generally well.  The patient denies GI upset, abdominal pain, hematochezia,  headaches, blurred vision or dyspigmentation.    Physical exam:  Vitals: There were no vitals taken for this visit.  GEN: This is a well developed, well-nourished female in no acute distress, in a pleasant mood.    SKIN: Sun-exposed skin, which includes the head/face, neck, both arms, digits, and/or nails was examined.     - Very few superficial acneiforms on the left lower cheek  - Pt was  wearing makeup  - Rolling acne scarring on the lower face  - No other lesions of concern on areas examined.       Impression/Plan:  1. Acne vulgaris, on isotretinoin. Resilient despite increasing doses of spironolactone, oral and topical antibiotics and topical retinoids.   Continue Ortho tri cyclin lo. Take one tablet po daily. Discussed risks of dvt, pe and indications to be evaluated or call the office. There is a family history of breast cancer but no blood clots or stroke. She is a non smoker. Discussed returning to OB/GYN in the future if considering a true long term contraception.   Continue isotretinoin 60mg daily 12/30/19 with food containing fat. Goal dose is 10,005- 14,674 mg (150-220mg/kg) for this 66.7 kg patient.   Method of contraception includes: abstinence. She is also on oral contraceptive pills.  Discussion of the risks and side effects of isotretinoin including but not limited to mucocutaneous dryness, arthralgias, myalgias, depression, suicidal ideation, headache, blurred vision, increase in liver function test and increase in lipids. The iPledge program brochure was provided and the contents discussed with the patient. The patient was counseled that they cannot give blood while on isotretinoin. Advised against tattoos and waxing. No personal or family history of inflammatory bowel disease or hypertriglyceridemia known to patient. Reviewed need to avoid alcohol on medication. The iPledge program consent was obtained. Patient counseled that if they wear contacts, the eyes may become too dry to tolerate. Recommend follow up with eye doctor if this occurs.    Discussed need for sun protection, at least SPF 30+.   Urine pregnancy test obtained today.   Labs: including qualitative hCG, CBC, CMP and fasting lipids will be obtained.   Patient's iPledge # is [id number].   Total dose: 3,000 mg for a weight based dose of 45.0mg/kg    Follow-up in 1 month, earlier for new or changing lesions.       Staff  Involved:  Scribe/Staff    Scribe Disclosure:   I, Darius Bunch, am serving as a scribe to document services personally performed by Suyapa Blake PA-C, based on data collection and the provider's statements to me.    Provider Disclosure:   The documentation recorded by the scribe accurately reflects the services I personally performed and the decisions made by me.    All risks, benefits and alternatives were discussed with patient.  Patient is in agreement and understands the assessment and plan.  All questions were answered.  Sun Screen Education was given.   Return to Clinic annually or sooner as needed.   Suyapa Blake PA-C   [facility]    normal...

## 2022-02-19 ENCOUNTER — HEALTH MAINTENANCE LETTER (OUTPATIENT)
Age: 27
End: 2022-02-19

## 2022-02-25 DIAGNOSIS — L70.0 ACNE VULGARIS: ICD-10-CM

## 2022-03-01 RX ORDER — SPIRONOLACTONE 50 MG/1
100 TABLET, FILM COATED ORAL DAILY
Qty: 180 TABLET | Refills: 0 | Status: SHIPPED | OUTPATIENT
Start: 2022-03-01 | End: 2022-03-28

## 2022-03-01 NOTE — TELEPHONE ENCOUNTER
SPIRONOLACTONE 50 MG TABLET  Last Written Prescription Date:   11/22/2021-1/21/2022  Last Fill Quantity: 60,   # refills: 2  Last Office Visit :  11/22/2021  Future Office visit:   5/23/2022  180 Tabs sent to pharm 3/1/2022      Maya Cantu RN  Central Triage Red Flags/Med Refills

## 2022-03-28 ENCOUNTER — OFFICE VISIT (OUTPATIENT)
Dept: DERMATOLOGY | Facility: CLINIC | Age: 27
End: 2022-03-28
Payer: COMMERCIAL

## 2022-03-28 VITALS — DIASTOLIC BLOOD PRESSURE: 78 MMHG | SYSTOLIC BLOOD PRESSURE: 118 MMHG

## 2022-03-28 DIAGNOSIS — L70.0 ACNE VULGARIS: Primary | ICD-10-CM

## 2022-03-28 DIAGNOSIS — L90.5 ACNE SCARRING: ICD-10-CM

## 2022-03-28 PROCEDURE — 99214 OFFICE O/P EST MOD 30 MIN: CPT | Performed by: PHYSICIAN ASSISTANT

## 2022-03-28 RX ORDER — SPIRONOLACTONE 50 MG/1
100 TABLET, FILM COATED ORAL DAILY
Qty: 180 TABLET | Refills: 1 | Status: SHIPPED | OUTPATIENT
Start: 2022-03-28

## 2022-03-28 RX ORDER — TRETINOIN 1 MG/G
CREAM TOPICAL AT BEDTIME
Qty: 45 G | Refills: 3 | Status: SHIPPED | OUTPATIENT
Start: 2022-03-28

## 2022-03-28 ASSESSMENT — PAIN SCALES - GENERAL: PAINLEVEL: NO PAIN (0)

## 2022-03-28 NOTE — LETTER
3/28/2022       RE: Yaritza Vuong   [address] [facility] [id number]     Dear Colleague,    Thank you for referring your patient, Yaritza Vuong , to the [facility] [place] at [facility]. Please see a copy of my visit note below.    [facility] Dermatology Note  Encounter Date: Mar 28, 2022  Office Visit     Dermatology Problem List:   1. Acne vulgaris   - current tx: tretinoin 0.1% cream, spironolactone 100 mg, dapsone 5% gel  - s/p minocycline 50 mg, tretinoin 0.025% and 0.05% cream, clindamycin 1% gel, isotretinoin completed 5/4/20, total cumulative dose 10,200mg (153mg/kg), OCPs  ____________________________________________    Assessment & Plan:    # Acne Vulgaris, hormonal component, improving since starting spironolactone.  - Increase tretinoin to 0.1% cream every other day  - Continue spironolactone 100 mg daily  - Continue dapsone 5% gel daily in AM  - BP today: 118/76    # Acne scarring  - Referral to cosmetic derm for consult for peels vs Fraxel laser    Procedures Performed:   None    Follow-up: 6 month(s) in-person, or earlier for new or changing lesions    Staff and Scribe:     Scribe Disclosure:  I, Jama Newby, am serving as a scribe to document services personally performed by Suyapa Blake PA-C based on data collection and the provider's statements to me.     Provider Disclosure:   The documentation recorded by the scribe accurately reflects the services I personally performed and the decisions made by me.    All risks, benefits and alternatives were discussed with patient.  Patient is in agreement and understands the assessment and plan.  All questions were answered.  Sun Screen Education was given.   Return to Clinic in 6 months or sooner as needed.   Suyapa Blake PA-C   [facility]    ____________________________________________    CC: Acne (follow up on acne, sx are the same, now ance spot on face )    HPI:  Ms. Yaritza Vuong  is a(n) 26 year old female who presents today as a return patient for follow-up. Last seen by me on 11/22/2021, at which time patient was started on spironolactone 100 mg daily and dapsone 5% gel daily in AM for treatment of acne vulgaris.    Today, patient reports that her acne has improved, but she is still experiencing some breakouts on her forehead. She notes being less stressed than she was at last visit. Denies side effects associated with spironolactone.    Patient is otherwise feeling well, without additional skin concerns.    Labs Reviewed:  Sodium, potassium, creatinine, BUN 11/22/21    Physical Exam:  Vitals: /78   SKIN: Focused examination of face was performed.  - 1 resolving papule on the right medial cheek.  - 2 closed comedones on the forehead.  - Rolling Acne scarring noted to bilateral cheeks.  - No other lesions of concern on areas examined.                 Medications:  Current Outpatient Medications   Medication     dapsone (ACZONE) 5 % topical gel     spironolactone (ALDACTONE) 50 MG tablet     tretinoin (RETIN-A) 0.05 % external cream     No current facility-administered medications for this visit.      Past Medical History:   Patient Active Problem List   Diagnosis     NO ACTIVE PROBLEMS     No past medical history on file.     CC Referred Self, MD  No address on file on close of this encounter.

## 2022-03-28 NOTE — PROGRESS NOTES
[facility] Dermatology Note  Encounter Date: Mar 28, 2022  Office Visit     Dermatology Problem List:   1. Acne vulgaris   - current tx: tretinoin 0.1% cream, spironolactone 100 mg, dapsone 5% gel  - s/p minocycline 50 mg, tretinoin 0.025% and 0.05% cream, clindamycin 1% gel, isotretinoin completed 5/4/20, total cumulative dose 10,200mg (153mg/kg), OCPs  ____________________________________________    Assessment & Plan:    # Acne Vulgaris, hormonal component, improving since starting spironolactone.  - Increase tretinoin to 0.1% cream every other day  - Continue spironolactone 100 mg daily  - Continue dapsone 5% gel daily in AM  - BP today: 118/76    # Acne scarring  - Referral to cosmetic derm for consult for peels vs Fraxel laser    Procedures Performed:   None    Follow-up: 6 month(s) in-person, or earlier for new or changing lesions    Staff and Scribe:     Scribe Disclosure:  I, Jama Newby, am serving as a scribe to document services personally performed by Suyapa Blake PA-C based on data collection and the provider's statements to me.     Provider Disclosure:   The documentation recorded by the scribe accurately reflects the services I personally performed and the decisions made by me.    All risks, benefits and alternatives were discussed with patient.  Patient is in agreement and understands the assessment and plan.  All questions were answered.  Sun Screen Education was given.   Return to Clinic in 6 months or sooner as needed.   Suyapa Blake PA-C   [facility]   ____________________________________________    CC: Acne (follow up on acne, sx are the same, now ance spot on face )    HPI:  Ms. Yaritza Vuong  is a(n) 26 year old female who presents today as a return patient for follow-up. Last seen by me on 11/22/2021, at which time patient was started on spironolactone 100 mg daily and dapsone 5% gel daily in AM for treatment of  acne vulgaris.    Today, patient reports that her acne has improved, but she is still experiencing some breakouts on her forehead. She notes being less stressed than she was at last visit. Denies side effects associated with spironolactone.    Patient is otherwise feeling well, without additional skin concerns.    Labs Reviewed:  Sodium, potassium, creatinine, BUN 11/22/21    Physical Exam:  Vitals: /78   SKIN: Focused examination of face was performed.  - 1 resolving papule on the right medial cheek.  - 2 closed comedones on the forehead.  - Rolling Acne scarring noted to bilateral cheeks.  - No other lesions of concern on areas examined.                 Medications:  Current Outpatient Medications   Medication     dapsone (ACZONE) 5 % topical gel     spironolactone (ALDACTONE) 50 MG tablet     tretinoin (RETIN-A) 0.05 % external cream     No current facility-administered medications for this visit.      Past Medical History:   Patient Active Problem List   Diagnosis     NO ACTIVE PROBLEMS     No past medical history on file.     CC Referred Self, MD  No address on file on close of this encounter.

## 2022-05-24 ENCOUNTER — E-VISIT (OUTPATIENT)
Dept: URGENT CARE | Facility: CLINIC | Age: 27
End: 2022-05-24
Payer: COMMERCIAL

## 2022-05-24 DIAGNOSIS — R21 RASH AND NONSPECIFIC SKIN ERUPTION: Primary | ICD-10-CM

## 2022-05-24 PROCEDURE — 99207 PR NON-BILLABLE SERV PER CHARTING: CPT | Performed by: EMERGENCY MEDICINE

## 2022-05-24 RX ORDER — CEPHALEXIN 500 MG/1
500 CAPSULE ORAL 3 TIMES DAILY
Qty: 21 CAPSULE | Refills: 0 | Status: CANCELLED | OUTPATIENT
Start: 2022-05-24

## 2022-05-24 RX ORDER — VALACYCLOVIR HYDROCHLORIDE 500 MG/1
TABLET, FILM COATED ORAL
Qty: 20 TABLET | Refills: 0 | Status: CANCELLED | OUTPATIENT
Start: 2022-05-24

## 2022-05-24 NOTE — PROGRESS NOTES
[facility] Dermatology Note  Encounter Date: May 25, 2022  Office Visit       Dermatology Problem List:   1. Acne vulgaris   - current tx: tretinoin 0.1% cream, spironolactone 100 mg, dapsone 5% gel  - s/p minocycline 50 mg, tretinoin 0.025% and 0.05% cream, clindamycin 1% gel, isotretinoin completed 5/4/20, total cumulative dose 10,200mg (153mg/kg), OCPs  ____________________________________________     Assessment & Plan:     # Acne Vulgaris, hormonal component, improving since starting spironolactone, dapsone topical and tretinoin  -Patient has IUD  -needs doxycycline prior to laser treatment     # Acne scarring  -Reviewed the risks of laser use including dyspigmentation, scarring, blistering and need to avoid tan prior. Reviewed this would be considered cosmetic.   -recommend CO2 laser X2-3 over fraxel 1550nm X 6 treatments. Reviewed that the CO2 does create open areas. Need for prophylaxis and risks of all lasers above  -reviewed cannot come tan  -pt would need valtrex start 2 days prior to procedure   -she will need doxy  -Recommend iron oxide sunscreen, reviewed after visit summary    #subcutaneous nodule with folliculitis int he area, consistent with early HS, right groin  -clindamycin lotion for flares  -BP wash for flares  -Start doxycycline 100 mg BID. Recommend that patient try tocalcium-containing products around the time of taking the medication.  Instructed to take with a full glass of fluid and food, not lying down.  Side effects of photosensitivity, headaches, GI upset discussed. Recommend sun protection. On mirena  -Go to ER or urgent care for signs of infection including increasing pain, increasing redness, discharge such as pus, fevers, chills or if not feeling well.       Procedures Performed:   NA    Follow-up: 4 weeks photos and phone for groin, need phone prior to next laser treatment to make sure acne clear    Staff:       Dee Dee Barriga MD  Assistant  Professor  Department of Dermatology  [facility]: Phone: [phone], Fax:[phone]  [facility]: Phone: [phone], Fax: [phone]          ____________________________________________    CC: No chief complaint on file.    HPI:  Ms. Yaritza Vuong  is a(n) 26 year old female who presents today as a return patient for acne scarring. Acne is well controlled. Wants laser  Treatment.     Reports a few weeks ago lesion on groin area. Report is had discharge maybe pus in past and now bump that is red. Lesion is firm. Has not had fevers and chills.     Patient is otherwise feeling well, without additional skin concerns.     Labs Reviewed:  Last bun creatinine normal    Physical Exam:  Vitals: There were no vitals taken for this visit.  SKIN: Focused examination of face  was performed.  - acneiform rolling scarring and possibly box scars noted  -subcutaneous nodule the right groin.   -perifolliuclar papules and papules  - No other lesions of concern on areas examined.     Medications:  Current Outpatient Medications   Medication     dapsone (ACZONE) 5 % topical gel     spironolactone (ALDACTONE) 50 MG tablet     tretinoin (RETIN-A) 0.05 % external cream     tretinoin (RETIN-A) 0.1 % external cream     No current facility-administered medications for this visit.      Past Medical History:   Patient Active Problem List   Diagnosis     NO ACTIVE PROBLEMS     No past medical history on file.    CC Suyapa Blake PA-C  [address]  [place] [address] on close of this encounter.

## 2022-05-24 NOTE — PATIENT INSTRUCTIONS
Go to ER or urgent care for signs of infection including increasing pain, increasing redness, discharge such as pus, fevers, chills or if not feeling well.         You possibly have something called hidradenitis supparativa     Can bleach clothing and towels. Please, hold this if irritated.          Sun screens with Iron Oxide      Skinceuticals sunscreen, fusion, carried at the [facility] or can be mailed to you by [place] pharmacy by calling [facility]:Call [phone] and ask to have product shipped to you.     Supergoop 100% Mineral CC cream 50    [employer] Kathya Tinted Face Mineral sunscreen 60    ISDIN Eryfotona Ageless Ultralight Tinted Mineral        Fraxel 1550nm I the less aggressive option        CO2RE Laser  more intense treatment  RISKS:  I will experience redness, swelling, possibly peeling, pain, and heat sensation. I may experience bleeding, oozing, itching, or acne. Risks are blistering, permanent scarring, temporary or permanent skin lightening or darkening, infection, and eye injury. I understand my outcome could be no improvement, slight improvement. Multiple treatments may be required.    TWO WEEKS BEFORE TREATMENT  Block 2-3 hours for your visit to our center  Stop use of all Retinols   Retin A, Tazorac,  anti-aging  products  Stop use of all glycolic acid treatments (longer if deeper peel)  Stop use of all salicylic acid products  Stop excessive sun exposure 8 weeks prior to treatment  Stop waxing  Stop abrasive scrubs  Stop microdermabrasion treatments  Stop hydroquinone(bleaching cream) 3 day sprior  Men should not shave 24 hours prior.    and bring  Aquaphor   Cetaphil cream  Cetaphil gentle facial cleanser  2 Days PRIOR TO LASER   Valtrex 500mg twice daily, start 2 days prior and continue taking for 1-2 weeks as per your dcotor    THE DAY OF TREATMENT  Come to the appointment with no make-up, lotions or other products on the face    Start   doxycycline  Take to your doctor about pain control after the procedure  If taking pain medication, please, bring a  or we will be unable to do the procedure      AFTER CARE INSTRUCTIONS  You will need:  Household white vinegar    Aquaphor ointment  Vanicream OR Cetaphil facial cleanser   Vanicream OR Cetaphil facial cream  Skinceuticals fusion sun block  Doxycycline and valtrex    Who should I call with questions?      [facility]: [phone]      [facility]: [phone]      For urgent needs outside of business hours call the [facility] at [phone]        and ask for the dermatology resident on call

## 2022-05-24 NOTE — PATIENT INSTRUCTIONS
Dear Yaritza Vuong ,    This area should be looked at. It may be an infection requiring antibiotics.      We are sorry you are not feeling well. Based on the responses you provided, it is recommended that you be seen in-person in urgent care so we can better evaluate your symptoms. Please click here to find the nearest urgent care location to you.   You will not be charged for this Visit. Thank you for trusting us with your care.    Lasha France MD

## 2022-05-25 ENCOUNTER — OFFICE VISIT (OUTPATIENT)
Dept: DERMATOLOGY | Facility: CLINIC | Age: 27
End: 2022-05-25
Attending: PHYSICIAN ASSISTANT
Payer: COMMERCIAL

## 2022-05-25 DIAGNOSIS — L90.5 ACNE SCARRING: ICD-10-CM

## 2022-05-25 DIAGNOSIS — L98.9 SKIN LESION: ICD-10-CM

## 2022-05-25 DIAGNOSIS — Z29.9 PROPHYLACTIC MEASURE: Primary | ICD-10-CM

## 2022-05-25 PROCEDURE — 99214 OFFICE O/P EST MOD 30 MIN: CPT | Performed by: DERMATOLOGY

## 2022-05-25 RX ORDER — DOXYCYCLINE 100 MG/1
100 CAPSULE ORAL 2 TIMES DAILY
Qty: 60 CAPSULE | Refills: 0 | Status: SHIPPED | OUTPATIENT
Start: 2022-05-25

## 2022-05-25 RX ORDER — CLINDAMYCIN PHOSPHATE 10 UG/ML
LOTION TOPICAL
Qty: 60 ML | Refills: 11 | Status: SHIPPED | OUTPATIENT
Start: 2022-05-25

## 2022-05-25 ASSESSMENT — PAIN SCALES - GENERAL: PAINLEVEL: NO PAIN (0)

## 2022-05-25 NOTE — NURSING NOTE
Dermatology Rooming Note    Yaritza Vuong 's goals for this visit include:   Chief Complaint   Patient presents with     Scar Management     Yaritza is here today to discuss scar treatment on her face.     Temitope Rasheed, Facilitator

## 2022-05-25 NOTE — LETTER
5/25/2022       RE: Yaritza Vuong   [address]  [place] [address]     Dear Colleague,    Thank you for referring your patient, Yaritza Vuong , to the [facility] [place] at [facility]. Please see a copy of my visit note below.    [facility] Dermatology Note  Encounter Date: May 25, 2022  Office Visit       Dermatology Problem List:   1. Acne vulgaris   - current tx: tretinoin 0.1% cream, spironolactone 100 mg, dapsone 5% gel  - s/p minocycline 50 mg, tretinoin 0.025% and 0.05% cream, clindamycin 1% gel, isotretinoin completed 5/4/20, total cumulative dose 10,200mg (153mg/kg), OCPs  ____________________________________________     Assessment & Plan:     # Acne Vulgaris, hormonal component, improving since starting spironolactone, dapsone topical and tretinoin  -Patient has IUD  -needs doxycycline prior to laser treatment     # Acne scarring  -Reviewed the risks of laser use including dyspigmentation, scarring, blistering and need to avoid tan prior. Reviewed this would be considered cosmetic.   -recommend CO2 laser X2-3 over fraxel 1550nm X 6 treatments. Reviewed that the CO2 does create open areas. Need for prophylaxis and risks of all lasers above  -reviewed cannot come tan  -pt would need valtrex start 2 days prior to procedure   -she will need doxy  -Recommend iron oxide sunscreen, reviewed after visit summary    #subcutaneous nodule with folliculitis int he area, consistent with early HS, right groin  -clindamycin lotion for flares  -BP wash for flares  -Start doxycycline 100 mg BID. Recommend that patient try tocalcium-containing products around the time of taking the medication.  Instructed to take with a full glass of fluid and food, not lying down.  Side effects of photosensitivity, headaches, GI upset discussed. Recommend sun protection. On mirena  -Go to ER or urgent care for signs of  infection including increasing pain, increasing redness, discharge such as pus, fevers, chills or if not feeling well.       Procedures Performed:   NA    Follow-up: 4 weeks photos and phone for groin, need phone prior to next laser treatment to make sure acne clear    Staff:       Dee Dee Barriga MD    Department of Dermatology  [facility]: Phone: [phone], Fax:[phone]  [facility]: Phone: [phone], Fax: [phone]          ____________________________________________    CC: No chief complaint on file.    HPI:  Ms. Yaritza Vuong  is a(n) 26 year old female who presents today as a return patient for acne scarring. Acne is well controlled. Wants laser  Treatment.     Reports a few weeks ago lesion on groin area. Report is had discharge maybe pus in past and now bump that is red. Lesion is firm. Has not had fevers and chills.     Patient is otherwise feeling well, without additional skin concerns.     Labs Reviewed:  Last bun creatinine normal    Physical Exam:  Vitals: There were no vitals taken for this visit.  SKIN: Focused examination of face  was performed.  - acneiform rolling scarring and possibly box scars noted  -subcutaneous nodule the right groin.   -perifolliuclar papules and papules  - No other lesions of concern on areas examined.     Medications:  Current Outpatient Medications   Medication     dapsone (ACZONE) 5 % topical gel     spironolactone (ALDACTONE) 50 MG tablet     tretinoin (RETIN-A) 0.05 % external cream     tretinoin (RETIN-A) 0.1 % external cream     No current facility-administered medications for this visit.      Past Medical History:   Patient Active Problem List   Diagnosis     NO ACTIVE PROBLEMS     No past medical history on file.    CC Suyapa Blake PA-C  [address] [facility] [address]  [place] [address] on close of this  encounter.

## 2022-10-22 ENCOUNTER — HEALTH MAINTENANCE LETTER (OUTPATIENT)
Age: 27
End: 2022-10-22

## 2023-11-04 ENCOUNTER — HEALTH MAINTENANCE LETTER (OUTPATIENT)
Age: 28
End: 2023-11-04

## 2024-07-14 ENCOUNTER — MYC REFILL (OUTPATIENT)
Dept: DERMATOLOGY | Facility: CLINIC | Age: 29
End: 2024-07-14
Payer: COMMERCIAL

## 2024-07-14 DIAGNOSIS — L70.0 ACNE VULGARIS: ICD-10-CM

## 2024-07-14 RX ORDER — TRETINOIN 1 MG/G
CREAM TOPICAL AT BEDTIME
Qty: 45 G | Refills: 3 | Status: CANCELLED | OUTPATIENT
Start: 2024-07-14

## 2024-07-16 RX ORDER — TRETINOIN 1 MG/G
CREAM TOPICAL AT BEDTIME
Qty: 45 G | Refills: 3 | OUTPATIENT
Start: 2024-07-16

## 2024-07-16 NOTE — TELEPHONE ENCOUNTER
LVD:  5/25/2022  [facility] Dee Dee Real MD  Dermatology     Refill denied per protocol.

## 2024-12-22 ENCOUNTER — HEALTH MAINTENANCE LETTER (OUTPATIENT)
Age: 29
End: 2024-12-22